# Patient Record
Sex: FEMALE | Race: WHITE | NOT HISPANIC OR LATINO
[De-identification: names, ages, dates, MRNs, and addresses within clinical notes are randomized per-mention and may not be internally consistent; named-entity substitution may affect disease eponyms.]

---

## 2017-10-16 ENCOUNTER — HOSPITAL (OUTPATIENT)
Dept: OTHER | Age: 52
End: 2017-10-16
Attending: OBSTETRICS & GYNECOLOGY

## 2017-11-02 ENCOUNTER — HOSPITAL (OUTPATIENT)
Dept: OTHER | Age: 52
End: 2017-11-02
Attending: OBSTETRICS & GYNECOLOGY

## 2020-07-09 ENCOUNTER — OFFICE VISIT (OUTPATIENT)
Dept: SURGERY | Facility: CLINIC | Age: 55
End: 2020-07-09
Payer: COMMERCIAL

## 2020-07-09 VITALS
HEIGHT: 63.4 IN | OXYGEN SATURATION: 98 % | DIASTOLIC BLOOD PRESSURE: 89 MMHG | WEIGHT: 216 LBS | BODY MASS INDEX: 37.8 KG/M2 | SYSTOLIC BLOOD PRESSURE: 150 MMHG | HEART RATE: 71 BPM

## 2020-07-09 DIAGNOSIS — I10 ESSENTIAL HYPERTENSION: Primary | ICD-10-CM

## 2020-07-09 DIAGNOSIS — R03.0 ELEVATED BLOOD PRESSURE READING: ICD-10-CM

## 2020-07-09 DIAGNOSIS — E06.3 HASHIMOTO'S THYROIDITIS: ICD-10-CM

## 2020-07-09 DIAGNOSIS — R63.2 INCREASED APPETITE: ICD-10-CM

## 2020-07-09 DIAGNOSIS — E66.9 OBESITY (BMI 30-39.9): ICD-10-CM

## 2020-07-09 DIAGNOSIS — Z51.81 ENCOUNTER FOR THERAPEUTIC DRUG MONITORING: ICD-10-CM

## 2020-07-09 DIAGNOSIS — R53.82 CHRONIC FATIGUE: ICD-10-CM

## 2020-07-09 PROCEDURE — 99204 OFFICE O/P NEW MOD 45 MIN: CPT | Performed by: INTERNAL MEDICINE

## 2020-07-09 RX ORDER — PHENTERMINE HYDROCHLORIDE 30 MG/1
30 CAPSULE ORAL EVERY MORNING
Qty: 30 CAPSULE | Refills: 1 | Status: SHIPPED | OUTPATIENT
Start: 2020-07-09 | End: 2020-08-18

## 2020-07-09 RX ORDER — HYDROCHLOROTHIAZIDE 12.5 MG/1
12.5 CAPSULE, GELATIN COATED ORAL DAILY
Qty: 30 CAPSULE | Refills: 1 | Status: SHIPPED | OUTPATIENT
Start: 2020-07-09 | End: 2020-08-18 | Stop reason: DRUGHIGH

## 2020-07-09 NOTE — PROGRESS NOTES
[facility] Consultation Note       Patient:  Faye Stern  :      1965  MRN:      [id number]    Referring Provider: Dr. Segovia ref.  provider found       Chief Complaint:  Patient presents with:  Consult  Weight Management      SUB file        Inability: Not on file      Transportation needs:        Medical: Not on file        Non-medical: Not on file    Tobacco Use      Smoking status: Never Smoker      Smokeless tobacco: Never Used    Substance and Sexual Activity      Alcohol use: fresh fruit or vegetables daily    Behavior Modifications Reviewed and Discussed:    Eat breakfast, Eat 3 meals per day, Plan meals in advance, Read nutrition labels, Drink 64oz of water per day, Maintain a daily food journal, No drinking 30 minutes before regular soda. 3. Increase activity-upper body exercises, walk 10 minutes per day. 4. Increase fruit and vegetable servings to 5-6 per day.         PHENTERMINE: Since the patient would like to try phentermine, and is aware of the potential side effects (hy THYROID STIM HORMONE; Future  -     CBC, PLATELET; NO DIFFERENTIAL; Future  -     Phentermine HCl 30 MG Oral Cap; Take 1 capsule (30 mg total) by mouth every morning.     Hashimoto's thyroiditis  -     LEPTIN, SERUM; Future  -     COMP METABOLIC PANEL (14);

## 2020-07-14 ENCOUNTER — APPOINTMENT (OUTPATIENT)
Dept: LAB | Facility: HOSPITAL | Age: 55
End: 2020-07-14
Attending: INTERNAL MEDICINE
Payer: COMMERCIAL

## 2020-07-14 DIAGNOSIS — Z51.81 ENCOUNTER FOR THERAPEUTIC DRUG MONITORING: ICD-10-CM

## 2020-07-14 DIAGNOSIS — R53.82 CHRONIC FATIGUE: ICD-10-CM

## 2020-07-14 DIAGNOSIS — E66.9 OBESITY (BMI 30-39.9): ICD-10-CM

## 2020-07-14 DIAGNOSIS — I10 ESSENTIAL HYPERTENSION: ICD-10-CM

## 2020-07-14 DIAGNOSIS — R63.2 INCREASED APPETITE: ICD-10-CM

## 2020-07-14 DIAGNOSIS — R03.0 ELEVATED BLOOD PRESSURE READING: ICD-10-CM

## 2020-07-14 DIAGNOSIS — E06.3 HASHIMOTO'S THYROIDITIS: ICD-10-CM

## 2020-07-14 LAB
ALBUMIN SERPL-MCNC: 3.7 G/DL (ref 3.4–5)
ALBUMIN/GLOB SERPL: 1.1 {RATIO} (ref 1–2)
ALP LIVER SERPL-CCNC: 62 U/L (ref 41–108)
ALT SERPL-CCNC: 25 U/L (ref 13–56)
ANION GAP SERPL CALC-SCNC: 6 MMOL/L (ref 0–18)
AST SERPL-CCNC: 14 U/L (ref 15–37)
BILIRUB SERPL-MCNC: 0.7 MG/DL (ref 0.1–2)
BUN BLD-MCNC: 18 MG/DL (ref 7–18)
BUN/CREAT SERPL: 27.3 (ref 10–20)
CALCIUM BLD-MCNC: 8.9 MG/DL (ref 8.5–10.1)
CHLORIDE SERPL-SCNC: 105 MMOL/L (ref 98–112)
CHOLEST SMN-MCNC: 179 MG/DL (ref ?–200)
CO2 SERPL-SCNC: 29 MMOL/L (ref 21–32)
CREAT BLD-MCNC: 0.66 MG/DL (ref 0.55–1.02)
DEPRECATED RDW RBC AUTO: 44.5 FL (ref 35.1–46.3)
ERYTHROCYTE [DISTWIDTH] IN BLOOD BY AUTOMATED COUNT: 13.2 % (ref 11–15)
GLOBULIN PLAS-MCNC: 3.5 G/DL (ref 2.8–4.4)
GLUCOSE BLD-MCNC: 91 MG/DL (ref 70–99)
HCT VFR BLD AUTO: 44.1 % (ref 35–48)
HDLC SERPL-MCNC: 69 MG/DL (ref 40–59)
HGB BLD-MCNC: 14.8 G/DL (ref 12–16)
LDLC SERPL CALC-MCNC: 96 MG/DL (ref ?–100)
M PROTEIN MFR SERPL ELPH: 7.2 G/DL (ref 6.4–8.2)
MCH RBC QN AUTO: 30.8 PG (ref 26–34)
MCHC RBC AUTO-ENTMCNC: 33.6 G/DL (ref 31–37)
MCV RBC AUTO: 91.9 FL (ref 80–100)
NONHDLC SERPL-MCNC: 110 MG/DL (ref ?–130)
OSMOLALITY SERPL CALC.SUM OF ELEC: 291 MOSM/KG (ref 275–295)
PATIENT FASTING Y/N/NP: YES
PATIENT FASTING Y/N/NP: YES
PLATELET # BLD AUTO: 253 10(3)UL (ref 150–450)
POTASSIUM SERPL-SCNC: 3.8 MMOL/L (ref 3.5–5.1)
RBC # BLD AUTO: 4.8 X10(6)UL (ref 3.8–5.3)
SODIUM SERPL-SCNC: 140 MMOL/L (ref 136–145)
TRIGL SERPL-MCNC: 71 MG/DL (ref 30–149)
TSI SER-ACNC: 1.84 MIU/ML (ref 0.36–3.74)
VIT B12 SERPL-MCNC: 386 PG/ML (ref 193–986)
VLDLC SERPL CALC-MCNC: 14 MG/DL (ref 0–30)
WBC # BLD AUTO: 9.4 X10(3) UL (ref 4–11)

## 2020-07-14 PROCEDURE — 80053 COMPREHEN METABOLIC PANEL: CPT

## 2020-07-14 PROCEDURE — 85027 COMPLETE CBC AUTOMATED: CPT

## 2020-07-14 PROCEDURE — 80061 LIPID PANEL: CPT

## 2020-07-14 PROCEDURE — 82397 CHEMILUMINESCENT ASSAY: CPT

## 2020-07-14 PROCEDURE — 36415 COLL VENOUS BLD VENIPUNCTURE: CPT

## 2020-07-14 PROCEDURE — 82607 VITAMIN B-12: CPT

## 2020-07-14 PROCEDURE — 84443 ASSAY THYROID STIM HORMONE: CPT

## 2020-07-16 LAB — LEPTIN: 36.2 NG/ML

## 2020-08-18 ENCOUNTER — OFFICE VISIT (OUTPATIENT)
Dept: SURGERY | Facility: CLINIC | Age: 55
End: 2020-08-18
Payer: COMMERCIAL

## 2020-08-18 VITALS
WEIGHT: 207 LBS | SYSTOLIC BLOOD PRESSURE: 169 MMHG | BODY MASS INDEX: 36.22 KG/M2 | OXYGEN SATURATION: 99 % | HEIGHT: 63.4 IN | HEART RATE: 75 BPM | DIASTOLIC BLOOD PRESSURE: 91 MMHG

## 2020-08-18 DIAGNOSIS — R63.2 INCREASED APPETITE: ICD-10-CM

## 2020-08-18 DIAGNOSIS — I10 HTN (HYPERTENSION), BENIGN: Primary | ICD-10-CM

## 2020-08-18 DIAGNOSIS — E66.9 OBESITY (BMI 30-39.9): ICD-10-CM

## 2020-08-18 PROCEDURE — 3080F DIAST BP >= 90 MM HG: CPT | Performed by: INTERNAL MEDICINE

## 2020-08-18 PROCEDURE — 3077F SYST BP >= 140 MM HG: CPT | Performed by: INTERNAL MEDICINE

## 2020-08-18 PROCEDURE — 99214 OFFICE O/P EST MOD 30 MIN: CPT | Performed by: INTERNAL MEDICINE

## 2020-08-18 PROCEDURE — 3008F BODY MASS INDEX DOCD: CPT | Performed by: INTERNAL MEDICINE

## 2020-08-18 RX ORDER — HYDROCHLOROTHIAZIDE 25 MG/1
25 TABLET ORAL DAILY
Qty: 30 TABLET | Refills: 1 | Status: SHIPPED | OUTPATIENT
Start: 2020-08-18 | End: 2020-12-15

## 2020-08-18 RX ORDER — PHENTERMINE HYDROCHLORIDE 30 MG/1
30 CAPSULE ORAL EVERY MORNING
Qty: 30 CAPSULE | Refills: 1 | Status: SHIPPED | OUTPATIENT
Start: 2020-08-18 | End: 2020-10-13

## 2020-08-18 RX ORDER — TOPIRAMATE 25 MG/1
25 TABLET ORAL EVERY EVENING
Qty: 30 TABLET | Refills: 2 | Status: SHIPPED | OUTPATIENT
Start: 2020-08-18 | End: 2021-02-04

## 2020-08-18 NOTE — PROGRESS NOTES
[address]  Dept: [phone]       Patient:  Gutierrez Colmenares  :      1965  MRN:      [id number]    Chief Complaint:  Patient presen Non-medical: Not on file    Tobacco Use      Smoking status: Never Smoker      Smokeless tobacco: Never Used    Substance and Sexual Activity      Alcohol use: Yes        Comment: Socially 3 X per week      Drug use: No      Sexual activity: Not on jan Goals  Limit carbohydrates to 100 gms per day, Eat 100-200 calories within 1 hour of waking  and Eat 3-4 cups of fresh fruits or vegetables daily    Behavior Modifications Reviewed and Discussed  Eat breakfast, Eat 3 meals per day, Plan meals in advance, R Patient is not interested in bariatric surgery. Patient desires to pursue traditional weight loss at this time. HTN: not controlled  Will increase hctz to 25 mg    Should check bp at home  Will message me in two weeks    Goals for next month:  1.

## 2020-10-13 DIAGNOSIS — E66.9 OBESITY (BMI 30-39.9): ICD-10-CM

## 2020-10-13 RX ORDER — PHENTERMINE HYDROCHLORIDE 30 MG/1
CAPSULE ORAL
Qty: 30 CAPSULE | Refills: 0 | Status: SHIPPED | OUTPATIENT
Start: 2020-10-13 | End: 2020-11-11

## 2020-11-10 DIAGNOSIS — E66.9 OBESITY (BMI 30-39.9): ICD-10-CM

## 2020-11-11 RX ORDER — PHENTERMINE HYDROCHLORIDE 30 MG/1
CAPSULE ORAL
Qty: 30 CAPSULE | Refills: 0 | Status: SHIPPED | OUTPATIENT
Start: 2020-11-11 | End: 2020-12-08

## 2020-12-08 DIAGNOSIS — E66.9 OBESITY (BMI 30-39.9): ICD-10-CM

## 2020-12-08 RX ORDER — PHENTERMINE HYDROCHLORIDE 30 MG/1
CAPSULE ORAL
Qty: 30 CAPSULE | Refills: 0 | Status: SHIPPED | OUTPATIENT
Start: 2020-12-08 | End: 2020-12-15

## 2020-12-15 ENCOUNTER — OFFICE VISIT (OUTPATIENT)
Dept: SURGERY | Facility: CLINIC | Age: 55
End: 2020-12-15
Payer: COMMERCIAL

## 2020-12-15 VITALS
DIASTOLIC BLOOD PRESSURE: 87 MMHG | SYSTOLIC BLOOD PRESSURE: 131 MMHG | OXYGEN SATURATION: 99 % | HEART RATE: 82 BPM | WEIGHT: 204 LBS | HEIGHT: 63.4 IN | BODY MASS INDEX: 35.7 KG/M2

## 2020-12-15 DIAGNOSIS — E66.9 OBESITY (BMI 30-39.9): ICD-10-CM

## 2020-12-15 DIAGNOSIS — Z51.81 ENCOUNTER FOR THERAPEUTIC DRUG MONITORING: ICD-10-CM

## 2020-12-15 DIAGNOSIS — R63.2 INCREASED APPETITE: ICD-10-CM

## 2020-12-15 DIAGNOSIS — I10 HTN (HYPERTENSION), BENIGN: Primary | ICD-10-CM

## 2020-12-15 PROCEDURE — 3008F BODY MASS INDEX DOCD: CPT | Performed by: INTERNAL MEDICINE

## 2020-12-15 PROCEDURE — 3075F SYST BP GE 130 - 139MM HG: CPT | Performed by: INTERNAL MEDICINE

## 2020-12-15 PROCEDURE — 3079F DIAST BP 80-89 MM HG: CPT | Performed by: INTERNAL MEDICINE

## 2020-12-15 PROCEDURE — 99214 OFFICE O/P EST MOD 30 MIN: CPT | Performed by: INTERNAL MEDICINE

## 2020-12-15 RX ORDER — HYDROCHLOROTHIAZIDE 25 MG/1
25 TABLET ORAL DAILY
Qty: 30 TABLET | Refills: 1 | Status: SHIPPED | OUTPATIENT
Start: 2020-12-15 | End: 2021-02-19

## 2020-12-15 RX ORDER — LISINOPRIL 40 MG/1
TABLET ORAL
COMMUNITY
Start: 2020-12-08 | End: 2021-04-15

## 2020-12-15 RX ORDER — PHENTERMINE HYDROCHLORIDE 30 MG/1
30 CAPSULE ORAL EVERY MORNING
Qty: 30 CAPSULE | Refills: 2 | Status: SHIPPED | OUTPATIENT
Start: 2020-12-15 | End: 2021-04-15 | Stop reason: ALTCHOICE

## 2020-12-15 NOTE — PROGRESS NOTES
[address]  Dept: [phone]       Patient:  Lorena Quiroz  :      1965  MRN:      [id number]    Chief Complaint:  Patient presen Transportation needs        Medical: Not on file        Non-medical: Not on file    Tobacco Use      Smoking status: Never Smoker      Smokeless tobacco: Never Used    Substance and Sexual Activity      Alcohol use: Yes        Comment: Socially 3 X per wee Exercise     Nutritional Goals  Limit carbohydrates to 100 gms per day, Eat 100-200 calories within 1 hour of waking  and Eat 3-4 cups of fresh fruits or vegetables daily    Behavior Modifications Reviewed and Discussed  Eat breakfast, Eat 3 meals per day, therapeutic drug monitoring  Obesity (bmi 30-39. 9)    PLAN     Patient is not interested in bariatric surgery. Patient desires to pursue traditional weight loss at this time. HYPERTENSION: Blood pressure stable on the above medications.  No interval ch

## 2021-01-19 ENCOUNTER — EKG ENCOUNTER (OUTPATIENT)
Dept: LAB | Facility: HOSPITAL | Age: 56
End: 2021-01-19
Attending: INTERNAL MEDICINE
Payer: COMMERCIAL

## 2021-01-19 DIAGNOSIS — R63.2 INCREASED APPETITE: ICD-10-CM

## 2021-01-19 DIAGNOSIS — E66.9 OBESITY (BMI 30-39.9): ICD-10-CM

## 2021-01-19 DIAGNOSIS — R03.0 ELEVATED BLOOD PRESSURE READING: ICD-10-CM

## 2021-01-19 PROCEDURE — 93010 ELECTROCARDIOGRAM REPORT: CPT | Performed by: INTERNAL MEDICINE

## 2021-01-19 PROCEDURE — 93005 ELECTROCARDIOGRAM TRACING: CPT

## 2021-02-04 DIAGNOSIS — E66.9 OBESITY (BMI 30-39.9): ICD-10-CM

## 2021-02-04 RX ORDER — TOPIRAMATE 25 MG/1
25 TABLET ORAL EVERY EVENING
Qty: 30 TABLET | Refills: 1 | Status: SHIPPED | OUTPATIENT
Start: 2021-02-04 | End: 2021-04-15 | Stop reason: ALTCHOICE

## 2021-02-19 DIAGNOSIS — I10 HTN (HYPERTENSION), BENIGN: ICD-10-CM

## 2021-02-19 RX ORDER — HYDROCHLOROTHIAZIDE 25 MG/1
TABLET ORAL
Qty: 30 TABLET | Refills: 1 | Status: SHIPPED | OUTPATIENT
Start: 2021-02-19 | End: 2021-04-15

## 2021-04-20 DIAGNOSIS — E66.9 OBESITY (BMI 30-39.9): ICD-10-CM

## 2021-04-20 RX ORDER — TOPIRAMATE 25 MG/1
TABLET ORAL
Qty: 30 TABLET | Refills: 1 | OUTPATIENT
Start: 2021-04-20

## 2023-12-15 ENCOUNTER — OFFICE VISIT (OUTPATIENT)
Dept: UROLOGY | Facility: HOSPITAL | Age: 58
End: 2023-12-15
Attending: OBSTETRICS & GYNECOLOGY
Payer: COMMERCIAL

## 2023-12-15 VITALS — HEIGHT: 64 IN | TEMPERATURE: 98 F | WEIGHT: 198 LBS | BODY MASS INDEX: 33.8 KG/M2

## 2023-12-15 DIAGNOSIS — N81.84 PELVIC MUSCLE WASTING: ICD-10-CM

## 2023-12-15 DIAGNOSIS — N39.41 URGE INCONTINENCE: ICD-10-CM

## 2023-12-15 DIAGNOSIS — N39.3 FEMALE STRESS INCONTINENCE: ICD-10-CM

## 2023-12-15 DIAGNOSIS — N81.2 UTEROVAGINAL PROLAPSE, INCOMPLETE: ICD-10-CM

## 2023-12-15 DIAGNOSIS — N95.2 POSTMENOPAUSAL ATROPHIC VAGINITIS: ICD-10-CM

## 2023-12-15 DIAGNOSIS — N39.41 URGENCY INCONTINENCE: Primary | ICD-10-CM

## 2023-12-15 DIAGNOSIS — R35.1 NOCTURIA: ICD-10-CM

## 2023-12-15 LAB
BILIRUB UR QL: NEGATIVE
CLARITY UR: CLEAR
COLOR UR: YELLOW
CONTROL RUN WITHIN 24 HOURS?: YES
GLUCOSE UR-MCNC: NORMAL MG/DL
KETONES UR-MCNC: NEGATIVE MG/DL
LEUKOCYTE ESTERASE UR QL STRIP.AUTO: NEGATIVE
LEUKOCYTE ESTERASE URINE: NEGATIVE
NITRITE UR QL STRIP.AUTO: NEGATIVE
NITRITE URINE: NEGATIVE
PH UR: 6 [PH] (ref 5–8)
PROT UR-MCNC: NEGATIVE MG/DL
RBC #/AREA URNS AUTO: >10 /HPF
SP GR UR STRIP: 1.02 (ref 1–1.03)
UROBILINOGEN UR STRIP-ACNC: NORMAL

## 2023-12-15 PROCEDURE — 51701 INSERT BLADDER CATHETER: CPT | Performed by: OBSTETRICS & GYNECOLOGY

## 2023-12-15 PROCEDURE — 81001 URINALYSIS AUTO W/SCOPE: CPT | Performed by: OBSTETRICS & GYNECOLOGY

## 2023-12-15 PROCEDURE — 81002 URINALYSIS NONAUTO W/O SCOPE: CPT | Performed by: OBSTETRICS & GYNECOLOGY

## 2023-12-15 PROCEDURE — 99202 OFFICE O/P NEW SF 15 MIN: CPT

## 2023-12-15 PROCEDURE — 87086 URINE CULTURE/COLONY COUNT: CPT | Performed by: OBSTETRICS & GYNECOLOGY

## 2023-12-18 ENCOUNTER — TELEPHONE (OUTPATIENT)
Dept: UROLOGY | Facility: HOSPITAL | Age: 58
End: 2023-12-18

## 2023-12-18 NOTE — TELEPHONE ENCOUNTER
Outgoing telephone call to patient  The urine culture obtained on 12/15/23 showed no growth; however, the urinalysis obtained on 12/15/23 showed RBCs >10. You have a hx of microscopic hematuria and previously had an evaluation of the urinary tract system, 5/2021, with Urology, which including CT urogram and an office cystoscopy - WN. Urine cytology was not preformed at the time, which is necessary to make the microscopic hematuria w/u complete. We will obtain urine cytology at your upcoming UDS apt - 1/8/23 @ 8am.  All questions answered. Encouraged patient to call office with additional questions or concerns. Patient understands and agrees to plan.

## 2024-01-08 ENCOUNTER — OFFICE VISIT (OUTPATIENT)
Dept: UROLOGY | Facility: HOSPITAL | Age: 59
End: 2024-01-08
Attending: OBSTETRICS & GYNECOLOGY
Payer: COMMERCIAL

## 2024-01-08 VITALS — WEIGHT: 198 LBS | BODY MASS INDEX: 33.8 KG/M2 | HEIGHT: 64 IN

## 2024-01-08 DIAGNOSIS — N81.2 UTEROVAGINAL PROLAPSE, INCOMPLETE: Primary | ICD-10-CM

## 2024-01-08 DIAGNOSIS — R31.29 MICROSCOPIC HEMATURIA: ICD-10-CM

## 2024-01-08 DIAGNOSIS — N39.41 URGE INCONTINENCE: ICD-10-CM

## 2024-01-08 LAB
CONTROL RUN WITHIN 24 HOURS?: YES
LEUKOCYTE ESTERASE URINE: NEGATIVE
NITRITE URINE: NEGATIVE

## 2024-01-08 PROCEDURE — 51797 INTRAABDOMINAL PRESSURE TEST: CPT

## 2024-01-08 PROCEDURE — 81002 URINALYSIS NONAUTO W/O SCOPE: CPT

## 2024-01-08 PROCEDURE — 51729 CYSTOMETROGRAM W/VP&UP: CPT

## 2024-01-08 PROCEDURE — 51784 ANAL/URINARY MUSCLE STUDY: CPT

## 2024-01-08 PROCEDURE — 51741 ELECTRO-UROFLOWMETRY FIRST: CPT

## 2024-01-08 NOTE — PROCEDURES
Patient here for urodynamic testing.  Procedure explained and confirmed by patient.  See evaluation form for results.  Both verbal and written discharge instructions were given.  Patient tolerated procedure well and will follow up with Dr. Jada Santiago on 2024 for a UDS follow up at 12:30 via telephone .    URODYNAMIC EVALUATION    PATIENT HISTORY:    Prolapse:  Yes (Reports splinting for comfort)  ERIC:  No  UUI:  Yes  Nocturia:  more than 4 (Reports 3 to 5 times per night)  Frequency:  every 1-2 hours  Sense of Incomplete Emptying:  Yes (Reports shifting positions to void)  Constipation:  No (Bowel regimen reviewed. Handouts provided)  Last void prior to UDS testin.5 hours  Current urge to void?  Moderate  OAB meds stopped prior to test?  NA  Other symptoms?      Surgery?  []  No  [x]  Interested in surgery:   [x]  Yes, specify date: Surgery scheduled on 2024 for a Vaginal Hysterectomy, Poss. Bilateral Salping-Oophorectomy, USLS, EPER, Poss. Mus. and Cysto.     Surgical folder provided?  [x]  Yes  []  No Informed consent signed and scanned into the patients chart.     PATIENT DIAGNOSIS:  Cystocele, Midline N81.11, Rectocele, Midline R15.9, Urge Incontinence N39.41, and Uterovaginal Prolapse N81.2 (incomplete)    UDS PROCEDURAL FINDINGS:  Cystocele, Midline N81.11, Rectocele, Midline R15.9, Urge Incontinence N39.41, Uterovaginal Prolapse N81.2 (incomplete), and Detrusor Instability N31.9    MEDICATION:   No outpatient medications have been marked as taking for the 24 encounter (Office Visit) with [facility] PROCEDURE.        ALLERGIES:  Patient has no known allergies.      EXAM:  Urinalysis Dip:     Urine Cytology obtained and sent to the lab ( Hx. of Microscopic Hematuria) (CT Urogram, Cysto by Urology  wnl).  Today's Results   Component Date Value    control run 2024 Yes     Blood Urine 2024 Trace (A)     Nitrite Urine 2024 Negative     Leukocyte esterase urine 2024  Negative       [place] ( >30 degrees ):  [x]  Mobile  []  Fixed    Perineal Sensation:  [x]  Normal  []  Abnormal    Additional Notes:    PROLAPSE:  [x]  Yes  []  No  Prolapse reduced for testing?  [x]  Yes  []  No  [x]  Pessary for flow study  []  Manual  [x]  Half Speculum throughout testing    Additional Notes:    UROFLOWMETRY:  Unreduced    Voided Volume:               156              mL  Maximum Flow Rate:                    11            mL/sec  Average flow rate:                   6           mL/sec  Post-void Residual:               30           mL  Pattern:  []  Normal  []  Poor flow     [x]  Intermittent []  Other  Void:   [x]  Typical  []  Atypical    Additional Notes:     CYSTOMETRY:  Urethral Catheter:  Fr 7 / tdoc  Abd Catheter:     Fr 7 / tdoc   Infusion:  Water Rate 30 mL/min  Temp:  Room  Position:  [x]  Sit  []  Stand  []  Supine  First sensation:   64 mL  First desire to void:   125 mL  Strong desire to void:  227 mL  Maximum cystometric capacity:   300 mL  Detrusor Activity:  [x]  Unstable   []  Stable  Urge leakage?    []  Yes [x]  No  Volume at 1st unhibited detrusor cont:   75 mL  Detrusor instability provoked by:    [x]  Spontaneous []  Coughing  [x]  Filling  []  Valsalva  []  Other    Additional Notes:      URETHRAL FUNCTION:  Valsava (vesical) Leak Point Pressures:    Volume Leak Point Pressure Leak?    Cough Valsalva      100mL 128 75    cm H2O []  Yes [x]  No         REDUCED:half speculum  Volume Leak Point Pressure Leak?    Cough Valsalva      100mL 157 87    cm H2O []  Yes [x]  No   200mL 151 77    cm H2O []  Yes [x]  No   300mL 159 96    cm H2O []  Yes [x]  No     Genuine Stress Incontinence demonstrated?   []  Yes  [x]  No    Resting Urethral Pressure Profile:     Functional Urethral Length:         0.9 cm          0.9       cm    Maximum UCP:          76 cm          93   cm       PRESSURE/FLOW STUDY:  Reduced    Voided volume:   633     mL  Maximum flow rate:     5    mL/sec  Pressure Detrusor (at maximum flow):         84   cm H2O  Post void residual:      6         mL  Voiding mechanism:  []  Abnormal  [x]  Normal  []  Strain to void   []  Weak detrusor  Void:   [x]  Typical   []  Atypical    Additional Notes: Patient reports difficulty with voiding while sitting on UDS chair. Patient assisted up to the bathroom for flow study. Patient voided 633 mLs. PVR 6 mLs. #1 ring with support removed after testing completed.   Uroflowmetry, cystometry, and pressure / flow study were completed using calibrated electronic equipment and air charged transducers.    EMG:  During fill: Reactive    During flow study: Reactive    1/8/2024 9:21 AM     PERFORMED BY:  Dee Dee CASE RN      URODYNAMIC PHYSICIAN INTERPRETATION    IMPRESSION:   156/30cc & 633/6cc  [facility] 300cc  +DO @ 75cc  No ERIC    Post-Procedure Diagnoses: Detrusor instability [N31.9]    Comments:      Jada Santiago DO   1/8/2024   1:00 PM

## 2024-01-08 NOTE — PATIENT INSTRUCTIONS
[facility] UROGYNECOLOGY  [address]  [facility] [place] [facility] [address]  PH: [phone]  FAX: [phone]       Urodynamic Testing Discharge Instructions:    There are NO dietary or activity restrictions.  You may resume your normal schedule.      You may have mild discomfort for a few hours after your testing today.  There may be some mild burning when you urinate or you may see some blood in your urine.  These problems should not last more than 24 hours.  The following suggestions may minimize any symptoms you experience.    Drink 6-8 large glasses of water over the next 8 hours  A compress or sitz bath may be soothing  Tylenol or Ibuprofen may be taken as needed    If you experience any of the following, please call the office or, if after hours, the on-call physician at [phone].    Excessive pain  Bright red bloody discharge  Fever or chills  Continued urgency, frequency or burning with urination    Obtaining Test Results    Your urodynamic test will be interpreted by a specialist and available to the referring physician within 7-14 days.  Patients in our clinic are given an appointment to come back to discuss the results and any appropriate treatment recommendations.    Please do not hesitate to contact our office with any questions or concerns at [phone].    I acknowledge that I have received verbal and written discharge  instructions and that I understand these instructions clearly.    Patient Signature:    Date:    [facility]    BOWEL REGIMEN    Constipation can have detrimental effects on bladder function and can worsen the symptoms of prolapse.  It is important to avoid constipation.    The first step for treating constipation is to increase the amount of fiber in your diet.  It is usually difficult to get enough fiber each day in the food we eat.  Therefore, the best way to increase fiber is to take a  daily fiber supplement.      _____Fiber Supplement  (Metamucil, Citrucel, Benefiber, etc)    Begin with 1 dose (as instructed on the package) every morning.    If the stool is too hard, or if the stool consists of small, hard, marbles, you may need to increase to 1 dose each morning and 1 dose each evening.    If you find it difficult to take the fiber as directed (i.e. mixed with water or juice), you can mix the fiber with your cereal or with applesauce.    Don’t worry if you have to increase the amount---fiber is not addictive and will not cause diarrhea.      _____Milk of Magnesia    Begin with 1 teaspoon every evening.  This is a very low dose---approximately one-sixth of the typical dose.  You may need to increase the amount depending on your results.      _____Miralax    Miralax is a laxative available over the counter.  It can be used on a long-term basis if necessary.  The usual starting dose is 1 capful of powder mixed in fluid each morning.  The dose can be adjusted up or down depending on results and consistency of stool.[facility]    HAVING A URINARY CATHETER AFTER SURGERY    What is a urinary catheter?  A catheter is a soft tube used to drain urine from your bladder.    Why do I need a catheter?  A urinary catheter is used to help with healing immediately after surgery.  It works to keep your bladder empty while you are recovering.  Surgery, swelling and anesthesia can cause the bladder to lose its normal sensations for a while.  The catheter may stay in place for a week or more after you go home.    Where will I go to get the catheter removed?  Your catheter will be removed in the office.  Please call the office to schedule a voiding trial with the nurse.    How will the catheter be removed?  A nurse will disconnect your catheter from the drainage bag.  She will attach a syringe to the catheter and fill the bladder with sterile water until you have a  strong desire to urinate.  The catheter will be removed and you will be able to go to the bathroom to empty your bladder.    Will the catheter need to be replaced?  You will need to urinate a specific amount, depending on how much you were initially able to hold.  Your catheter will only need to be replaced if you are not able to empty the specific amount.  If the catheter is replaced, your nurse will discuss with you when you need to return.    What do I do after the catheter is removed?  For the first 24 hours, you should go to the bathroom with your first urge or every 2-3 hours while you are awake.  Urinate before you go to bed and if you wake up in the night, you do not need to set an alarm to wake up.  Drink plenty of water (6-8 glasses) slowly throughout the day.  Avoid liquids containing caffeine.  Continue with any pain medications (Motrin) as directed by the nurse.  Continue with your current bowel regime; avoid constipation.    What if I have trouble emptying my bladder?  If you are having trouble emptying your bladder, try the following tips:  Urinate normally then stand up, walk around for a minute or two, sit back down on the commode and attempt to urinate (double void).  Sit in a tub of warm water and try to urinate in the tub.  Run warm water over your vaginal area while attempting to urinate.    When should I call the office?  If you are unable to urinate for 6 hours.  You feel you need to urinate but cannot.  Urinating frequently in small amounts.  You experience abdominal bloating, pressure or back pain.  You have a fever over 100.5 for 4 hours or above 101.0 anytime.  You have nausea and/or vomiting.  Burning/pain with urination.    Please feel free to call the nurse at [phone] with any questions 8am-4:30pm Monday-Friday.    If the office is closed, you may call the on call physician at [phone] or go to the emergency room.[facility]      Post-Operative Guidelines    AVOID CONSTIPATION - Take Miralax: one capful in water or juice each morning.  You can also take each evening if needed.  No lifting over 10 lbs. (1 gallon of milk is 8 lbs.)  It is okay to walk up and down stairs and to walk outside, but be careful not to become fatigued.  Showers and tub baths are okay, even if you have a catheter or abdominal incision.  You may ride in a car immediately.  You may drive after 1-2 weeks.    Please call us for any of the following:  Temperature above 100.5 for 4 hours or above 101.0 at any time  Chest pain or trouble breathing  Vaginal bleeding heavier than a period  Redness, tenderness or swelling of your legs  Pain or burning when you urinate  Redness, pain or foul discharge from incision

## 2024-01-09 ENCOUNTER — TELEPHONE (OUTPATIENT)
Dept: UROLOGY | Facility: HOSPITAL | Age: 59
End: 2024-01-09

## 2024-01-09 LAB — NON GYNE INTERPRETATION: NEGATIVE

## 2024-01-09 NOTE — TELEPHONE ENCOUNTER
Left message for patient that her urine cytology was reviewed by PEGGY AUGUSTE and was benign.  Pt left call back number if she has any questions or concern.

## 2024-01-17 ENCOUNTER — VIRTUAL PHONE E/M (OUTPATIENT)
Dept: UROLOGY | Facility: HOSPITAL | Age: 59
End: 2024-01-17
Attending: OBSTETRICS & GYNECOLOGY
Payer: COMMERCIAL

## 2024-01-17 DIAGNOSIS — N32.81 DETRUSOR INSTABILITY: ICD-10-CM

## 2024-01-17 DIAGNOSIS — N39.41 URGE INCONTINENCE: ICD-10-CM

## 2024-01-17 DIAGNOSIS — N39.41 URGENCY INCONTINENCE: ICD-10-CM

## 2024-01-17 DIAGNOSIS — N81.2 UTEROVAGINAL PROLAPSE, INCOMPLETE: Primary | ICD-10-CM

## 2024-01-17 NOTE — PROGRESS NOTES
Pt presents w/ initial c/o bulge  Urodynamic testing undergone without complication.  Results reviewed with patient via telephone  Given circumstances surrounding COVID-19 this visit is being conducted as a televisit with pt's consent.  Pt in safe, private environment for televisit, provider located in the office setting    156/30cc & 633/6cc  [facility] 300cc  +DO @ 75cc  No ERIC    Discussed with patient mgmt options for DO/UUI, POP  Biggest bother is bulge, LBP, UUI & urinary urgency    Discussed dietary and behavioral modifications for mgmt of urinary symptoms  Discussed weight management and benefits of weight loss on urinary symptoms  Reviewed AUA/SUFU guidelines on mgmt of non-neurogenic OAB  Discussed pharmacologic and nonpharmacologic mgmt options of urinary symptoms - reviewed risks, benefits, alternatives, and goals of treatment  Discussed specific risks related to OAB meds including, but not limited to dry mouth, constipation, blurry vision, cognitive changes, and BP elevation.    Thorough discussion of surgical risks, benefits, and alternatives including, but not limited to bleeding/clots, infection, injury to nearby organs (urethra, bladder, ureters, bowel, blood vessels),dyspareunia, de lucita UI, worsening UI, recurrence, voiding dysfunction, and pain.  Discussed pain mgmt and potential need for narcotics. Discussed addiction potential with narcotics. IL  reviewed.    All questions answered.  Pt will proceed TVH poss BSO USVVS APE repair, cysto    Follow up two weeks post op    Dr. Jada Santiago    Pre op packet provided

## 2024-02-01 ENCOUNTER — TELEPHONE (OUTPATIENT)
Dept: UROLOGY | Facility: HOSPITAL | Age: 59
End: 2024-02-01

## 2024-02-01 NOTE — TELEPHONE ENCOUNTER
TC from pt asking if she needs to get any bloodwork prior to surgery. Per medical clearance form in [employer], pt is to have a medical evaluation (done), CBC, BMP, and EKG. Instructed pt to call PCP to arrange for these labs to be done. Pt understands and agrees to plan.

## 2024-02-10 ENCOUNTER — LAB ENCOUNTER (OUTPATIENT)
Dept: LAB | Facility: HOSPITAL | Age: 59
End: 2024-02-10
Attending: OBSTETRICS & GYNECOLOGY
Payer: COMMERCIAL

## 2024-02-10 ENCOUNTER — TELEPHONE (OUTPATIENT)
Dept: UROLOGY | Facility: CLINIC | Age: 59
End: 2024-02-10

## 2024-02-10 DIAGNOSIS — Z01.818 PREOP TESTING: ICD-10-CM

## 2024-02-10 LAB
ANTIBODY SCREEN: NEGATIVE
RH BLOOD TYPE: POSITIVE
RH BLOOD TYPE: POSITIVE
SARS-COV-2 RNA RESP QL NAA+PROBE: DETECTED

## 2024-02-10 PROCEDURE — 87635 SARS-COV-2 COVID-19 AMP PRB: CPT

## 2024-02-10 PROCEDURE — 86900 BLOOD TYPING SEROLOGIC ABO: CPT

## 2024-02-10 PROCEDURE — 36415 COLL VENOUS BLD VENIPUNCTURE: CPT

## 2024-02-10 PROCEDURE — 86850 RBC ANTIBODY SCREEN: CPT

## 2024-02-10 PROCEDURE — 86901 BLOOD TYPING SEROLOGIC RH(D): CPT

## 2024-02-10 NOTE — TELEPHONE ENCOUNTER
TC to pt in response to lab results  She felt bad last week, recent covid test +  Discussed with anesthesia, need to review current recommendations with PAT, LM with PAT  Will need to postpone her surgery  Will reschedule with Lucia  Pt understands and agrees to plan

## 2024-02-19 NOTE — PAT NURSING NOTE
Spoke to patient, states she tested positive to Covid on 2/10/24. States she only had a scratchy throat, a bit of runny nose. States she no longer have any active symptoms.

## 2024-02-21 NOTE — DISCHARGE INSTRUCTIONS
[facility]     Post-Operative Guidelines      AVOID CONSTIPATION - Take Miralax: one capful in water or juice each morning.  You can also take each evening if needed. Use milk of magnesia daily as needed to avoid straining with BMs  No lifting over 10 lbs. (1 gallon of milk is 8 lbs.)  It is okay to walk up and down stairs and to walk outside, but be careful not to become fatigued.  Showers and tub baths are okay, even if you have a catheter or abdominal incision.  You may ride in a car immediately.  You may drive after 1-2 weeks.    Please call us for any of the following:  Temperature above 100.5 for 4 hours or above 101.0 at any time  Chest pain or trouble breathing  Vaginal bleeding heavier than a period  Redness, tenderness or swelling of your legs  Pain or burning when you urinate  Redness, pain or foul discharge from incision    Office telephone, [phone], after hours [phone]       HOME INSTRUCTIONS  [facility] CARE INSTRUCTIONS: POST-OP ANESTHESIA  The medication that you received for sedation or general anesthesia can last up to 24 hours. Your judgment and reflexes may be altered, even if you feel like your normal self.      We Recommend:   Do not drive any motor vehicle or bicycle   Avoid mowing the lawn, playing sports, or working with power tools/applicances (power saws, electric knives or mixers)   That you have someone stay with you on your first night home   Do not drink alcohol or take sleeping pills or tranquilizers   Do not sign legal documents within 24 hours of your procedure   If you had a nerve block for your surgery, take extra care not to put any pressure on your arm or hand for 24 hours    It is normal:  For you to have a sore throat if you had a breathing tube during surgery (while you were asleep!). The sore throat should get better within 48 hours. You can gargle with warm salt water (1/2 tsp in 4 oz warm water) or use a  throat lozenge for comfort  To feel muscle aches or soreness especially in the abdomen, chest or neck. The achy feeling should go away in the next 24 hours  To feel weak, sleepy or \"wiped out\". Your should start feeling better in the next 24 hours.   To experience mild discomforts such as sore lip or tongue, headache, cramps, gas pains or a bloated feeling in your abdomen.   To experience mild back pain or soreness for a day or two if you had spinal or epidural anesthesia.   If you had laparoscopic surgery, to feel shoulder pain or discomfort on the day of surgery.   For some patients to have nausea after surgery/anesthesia    If you feel nausea or experience vomiting:   Try to move around less.   Eat less than usual or drink only liquids until the next morning   Nausea should resolve in about 24 hours    If you have a problem when you are at home:    Call your surgeons office   Discharge Instructions: After Your Surgery  You’ve just had surgery. During surgery, you were given medicine called anesthesia to keep you relaxed and free of pain. After surgery, you may have some pain or nausea. This is common. Here are some tips for feeling better and getting well after surgery.   Going home  Your healthcare provider will show you how to take care of yourself when you go home. They'll also answer your questions. Have an adult family member or friend drive you home. For the first 24 hours after your surgery:   Don't drive or use heavy equipment.  Don't make important decisions or sign legal papers.  Take medicines as directed.  Don't drink alcohol.  Have someone stay with you, if needed. They can watch for problems and help keep you safe.  Be sure to go to all follow-up visits with your healthcare provider. And rest after your surgery for as long as your provider tells you to.   Coping with pain  If you have pain after surgery, pain medicine will help you feel better. Take it as directed, before pain becomes severe. Also,  ask your healthcare provider or pharmacist about other ways to control pain. This might be with heat, ice, or relaxation. And follow any other instructions your surgeon or nurse gives you.      Stay on schedule with your medicine.     Tips for taking pain medicine  To get the best relief possible, remember these points:   Pain medicines can upset your stomach. Taking them with a little food may help.  Most pain relievers taken by mouth need at least 20 to 30 minutes to start to work.  Don't wait till your pain becomes severe before you take your medicine. Try to time your medicine so that you can take it before starting an activity. This might be before you get dressed, go for a walk, or sit down for dinner.  Constipation is a common side effect of some pain medicines. Call your healthcare provider before taking any medicines such as laxatives or stool softeners to help ease constipation. Also ask if you should skip any foods. Drinking lots of fluids and eating foods such as fruits and vegetables that are high in fiber can also help. Remember, don't take laxatives unless your surgeon has prescribed them.  Drinking alcohol and taking pain medicine can cause dizziness and slow your breathing. It can even be deadly. Don't drink alcohol while taking pain medicine.  Pain medicine can make you react more slowly to things. Don't drive or run machinery while taking pain medicine.  Your healthcare provider may tell you to take acetaminophen to help ease your pain. Ask them how much you're supposed to take each day. Acetaminophen or other pain relievers may interact with your prescription medicines or other over-the-counter (OTC) medicines. Some prescription medicines have acetaminophen and other ingredients in them. Using both prescription and OTC acetaminophen for pain can cause you to accidentally overdose. Read the labels on your OTC medicines with care. This will help you to clearly know the list of ingredients, how much  to take, and any warnings. It may also help you not take too much acetaminophen. If you have questions or don't understand the information, ask your pharmacist or healthcare provider to explain it to you before you take the OTC medicine.   Managing nausea  Some people have an upset stomach (nausea) after surgery. This is often because of anesthesia, pain, or pain medicine, less movement of food in the stomach, or the stress of surgery. These tips will help you handle nausea and eat healthy foods as you get better. If you were on a special food plan before surgery, ask your healthcare provider if you should follow it while you get better. Check with your provider on how your eating should progress. It may depend on the surgery you had. These general tips may help:   Don't push yourself to eat. Your body will tell you when to eat and how much.  Start off with clear liquids and soup. They're easier to digest.  Next try semi-solid foods as you feel ready. These include mashed potatoes, applesauce, and gelatin.  Slowly move to solid foods. Don’t eat fatty, rich, or spicy foods at first.  Don't force yourself to have 3 large meals a day. Instead eat smaller amounts more often.  Take pain medicines with a small amount of solid food, such as crackers or toast. This helps prevent nausea.  When to call your healthcare provider  Call your healthcare provider right away if you have any of these:   You still have too much pain, or the pain gets worse, after taking the medicine. The medicine may not be strong enough. Or there may be a complication from the surgery.  You feel too sleepy, dizzy, or groggy. The medicine may be too strong.  Side effects such as nausea or vomiting. Your healthcare provider may advise taking other medicines to .  Skin changes such as rash, itching, or hives. This may mean you have an allergic reaction. Your provider may advise taking other medicines.  The incision looks different (for instance, part of  it opens up).  Bleeding or fluid leaking from the incision site, and weren't told to expect that.  Fever of 100.4°F (38°C) or higher, or as directed by your provider.  Call 911  Call 911 right away if you have:   Trouble breathing  Facial swelling    If you have obstructive sleep apnea   You were given anesthesia medicine during surgery to keep you comfortable and free of pain. After surgery, you may have more apnea spells because of this medicine and other medicines you were given. The spells may last longer than normal.    At home:  Keep using the continuous positive airway pressure (CPAP) device when you sleep. Unless your healthcare provider tells you not to, use it when you sleep, day or night. CPAP is a common device used to treat obstructive sleep apnea.  Talk with your provider before taking any pain medicine, muscle relaxants, or sedatives. Your provider will tell you about the possible dangers of taking these medicines.  Contact your provider if your sleeping changes a lot even when taking medicines as directed.  [employer] last reviewed this educational content on 10/1/2021  © 0120-7831 [employer]. All rights reserved. This information is not intended as a substitute for professional medical care. Always follow your healthcare professional's instructions.       follow up in 1 week for voiding trial (nurse visit), 2 weeks with Dr. Santiago, sooner as needed

## 2024-02-26 ENCOUNTER — ANESTHESIA (OUTPATIENT)
Dept: SURGERY | Facility: HOSPITAL | Age: 59
End: 2024-02-26
Payer: COMMERCIAL

## 2024-02-26 ENCOUNTER — ANESTHESIA EVENT (OUTPATIENT)
Dept: SURGERY | Facility: HOSPITAL | Age: 59
End: 2024-02-26
Payer: COMMERCIAL

## 2024-02-26 ENCOUNTER — HOSPITAL ENCOUNTER (OUTPATIENT)
Facility: HOSPITAL | Age: 59
Discharge: HOME | End: 2024-02-27
Attending: OBSTETRICS & GYNECOLOGY | Admitting: OBSTETRICS & GYNECOLOGY
Payer: COMMERCIAL

## 2024-02-26 DIAGNOSIS — G89.18 POST-OP PAIN: Primary | ICD-10-CM

## 2024-02-26 PROBLEM — N81.2 UTEROVAGINAL PROLAPSE, INCOMPLETE: Status: ACTIVE | Noted: 2024-02-26

## 2024-02-26 PROCEDURE — 88307 TISSUE EXAM BY PATHOLOGIST: CPT | Performed by: OBSTETRICS & GYNECOLOGY

## 2024-02-26 PROCEDURE — 0UT97ZZ RESECTION OF UTERUS, VIA NATURAL OR ARTIFICIAL OPENING: ICD-10-PCS | Performed by: OBSTETRICS & GYNECOLOGY

## 2024-02-26 PROCEDURE — 0JQC0ZZ REPAIR PELVIC REGION SUBCUTANEOUS TISSUE AND FASCIA, OPEN APPROACH: ICD-10-PCS | Performed by: OBSTETRICS & GYNECOLOGY

## 2024-02-26 PROCEDURE — 0UT77ZZ RESECTION OF BILATERAL FALLOPIAN TUBES, VIA NATURAL OR ARTIFICIAL OPENING: ICD-10-PCS | Performed by: OBSTETRICS & GYNECOLOGY

## 2024-02-26 PROCEDURE — 0USG0ZZ REPOSITION VAGINA, OPEN APPROACH: ICD-10-PCS | Performed by: OBSTETRICS & GYNECOLOGY

## 2024-02-26 RX ORDER — FAMOTIDINE 20 MG/1
20 TABLET, FILM COATED ORAL ONCE
Status: COMPLETED | OUTPATIENT
Start: 2024-02-26 | End: 2024-02-26

## 2024-02-26 RX ORDER — ONDANSETRON 2 MG/ML
4 INJECTION INTRAMUSCULAR; INTRAVENOUS ONCE
Status: DISCONTINUED | OUTPATIENT
Start: 2024-02-26 | End: 2024-02-27

## 2024-02-26 RX ORDER — ROCURONIUM BROMIDE 10 MG/ML
INJECTION, SOLUTION INTRAVENOUS AS NEEDED
Status: DISCONTINUED | OUTPATIENT
Start: 2024-02-26 | End: 2024-02-26 | Stop reason: SURG

## 2024-02-26 RX ORDER — ONDANSETRON 4 MG/1
4 TABLET, FILM COATED ORAL EVERY 8 HOURS PRN
Status: DISCONTINUED | OUTPATIENT
Start: 2024-02-26 | End: 2024-02-27

## 2024-02-26 RX ORDER — HYDROCODONE BITARTRATE AND ACETAMINOPHEN 5; 325 MG/1; MG/1
1 TABLET ORAL EVERY 4 HOURS PRN
Status: DISCONTINUED | OUTPATIENT
Start: 2024-02-26 | End: 2024-02-27

## 2024-02-26 RX ORDER — IBUPROFEN 600 MG/1
600 TABLET ORAL EVERY 6 HOURS PRN
Qty: 30 TABLET | Refills: 3 | Status: SHIPPED | OUTPATIENT
Start: 2024-02-26

## 2024-02-26 RX ORDER — ONDANSETRON 2 MG/ML
4 INJECTION INTRAMUSCULAR; INTRAVENOUS EVERY 6 HOURS PRN
Status: DISCONTINUED | OUTPATIENT
Start: 2024-02-26 | End: 2024-02-26 | Stop reason: HOSPADM

## 2024-02-26 RX ORDER — HYDRALAZINE HYDROCHLORIDE 20 MG/ML
5 INJECTION INTRAMUSCULAR; INTRAVENOUS
Status: DISCONTINUED | OUTPATIENT
Start: 2024-02-26 | End: 2024-02-26 | Stop reason: HOSPADM

## 2024-02-26 RX ORDER — BUPIVACAINE HYDROCHLORIDE AND EPINEPHRINE 2.5; 5 MG/ML; UG/ML
INJECTION, SOLUTION INFILTRATION; PERINEURAL AS NEEDED
Status: DISCONTINUED | OUTPATIENT
Start: 2024-02-26 | End: 2024-02-26 | Stop reason: HOSPADM

## 2024-02-26 RX ORDER — HYDROCODONE BITARTRATE AND ACETAMINOPHEN 5; 325 MG/1; MG/1
1 TABLET ORAL EVERY 4 HOURS PRN
Qty: 20 TABLET | Refills: 0 | Status: SHIPPED | OUTPATIENT
Start: 2024-02-26

## 2024-02-26 RX ORDER — ACETAMINOPHEN 500 MG
1000 TABLET ORAL ONCE
Status: COMPLETED | OUTPATIENT
Start: 2024-02-26 | End: 2024-02-26

## 2024-02-26 RX ORDER — METOCLOPRAMIDE 10 MG/1
10 TABLET ORAL ONCE
Status: COMPLETED | OUTPATIENT
Start: 2024-02-26 | End: 2024-02-26

## 2024-02-26 RX ORDER — MIDAZOLAM HYDROCHLORIDE 1 MG/ML
INJECTION INTRAMUSCULAR; INTRAVENOUS AS NEEDED
Status: DISCONTINUED | OUTPATIENT
Start: 2024-02-26 | End: 2024-02-26 | Stop reason: SURG

## 2024-02-26 RX ORDER — PROCHLORPERAZINE EDISYLATE 5 MG/ML
5 INJECTION INTRAMUSCULAR; INTRAVENOUS EVERY 8 HOURS PRN
Status: DISCONTINUED | OUTPATIENT
Start: 2024-02-26 | End: 2024-02-26 | Stop reason: HOSPADM

## 2024-02-26 RX ORDER — ONDANSETRON 2 MG/ML
4 INJECTION INTRAMUSCULAR; INTRAVENOUS EVERY 8 HOURS PRN
Status: DISCONTINUED | OUTPATIENT
Start: 2024-02-26 | End: 2024-02-27

## 2024-02-26 RX ORDER — HYDROMORPHONE HYDROCHLORIDE 1 MG/ML
0.4 INJECTION, SOLUTION INTRAMUSCULAR; INTRAVENOUS; SUBCUTANEOUS EVERY 5 MIN PRN
Status: DISCONTINUED | OUTPATIENT
Start: 2024-02-26 | End: 2024-02-26 | Stop reason: HOSPADM

## 2024-02-26 RX ORDER — METOCLOPRAMIDE HYDROCHLORIDE 5 MG/ML
10 INJECTION INTRAMUSCULAR; INTRAVENOUS ONCE
Status: COMPLETED | OUTPATIENT
Start: 2024-02-26 | End: 2024-02-26

## 2024-02-26 RX ORDER — KETOROLAC TROMETHAMINE 30 MG/ML
30 INJECTION, SOLUTION INTRAMUSCULAR; INTRAVENOUS EVERY 6 HOURS
Status: DISCONTINUED | OUTPATIENT
Start: 2024-02-26 | End: 2024-02-27

## 2024-02-26 RX ORDER — HYDROMORPHONE HYDROCHLORIDE 1 MG/ML
0.4 INJECTION, SOLUTION INTRAMUSCULAR; INTRAVENOUS; SUBCUTANEOUS EVERY 2 HOUR PRN
Status: DISCONTINUED | OUTPATIENT
Start: 2024-02-26 | End: 2024-02-27

## 2024-02-26 RX ORDER — SODIUM CHLORIDE 9 MG/ML
INJECTION, SOLUTION INTRAVENOUS CONTINUOUS PRN
Status: DISCONTINUED | OUTPATIENT
Start: 2024-02-26 | End: 2024-02-26 | Stop reason: SURG

## 2024-02-26 RX ORDER — LABETALOL HYDROCHLORIDE 5 MG/ML
5 INJECTION, SOLUTION INTRAVENOUS EVERY 5 MIN PRN
Status: DISCONTINUED | OUTPATIENT
Start: 2024-02-26 | End: 2024-02-26 | Stop reason: HOSPADM

## 2024-02-26 RX ORDER — HYDROMORPHONE HYDROCHLORIDE 1 MG/ML
0.2 INJECTION, SOLUTION INTRAMUSCULAR; INTRAVENOUS; SUBCUTANEOUS EVERY 5 MIN PRN
Status: DISCONTINUED | OUTPATIENT
Start: 2024-02-26 | End: 2024-02-26 | Stop reason: HOSPADM

## 2024-02-26 RX ORDER — CEFAZOLIN SODIUM/WATER 2 G/20 ML
2 SYRINGE (ML) INTRAVENOUS ONCE
Status: COMPLETED | OUTPATIENT
Start: 2024-02-26 | End: 2024-02-26

## 2024-02-26 RX ORDER — HYDROMORPHONE HYDROCHLORIDE 1 MG/ML
0.6 INJECTION, SOLUTION INTRAMUSCULAR; INTRAVENOUS; SUBCUTANEOUS EVERY 5 MIN PRN
Status: DISCONTINUED | OUTPATIENT
Start: 2024-02-26 | End: 2024-02-26 | Stop reason: HOSPADM

## 2024-02-26 RX ORDER — HYDROCODONE BITARTRATE AND ACETAMINOPHEN 5; 325 MG/1; MG/1
1 TABLET ORAL ONCE
Status: COMPLETED | OUTPATIENT
Start: 2024-02-26 | End: 2024-02-26

## 2024-02-26 RX ORDER — SODIUM CHLORIDE, SODIUM LACTATE, POTASSIUM CHLORIDE, CALCIUM CHLORIDE 600; 310; 30; 20 MG/100ML; MG/100ML; MG/100ML; MG/100ML
INJECTION, SOLUTION INTRAVENOUS CONTINUOUS
Status: DISCONTINUED | OUTPATIENT
Start: 2024-02-26 | End: 2024-02-27

## 2024-02-26 RX ORDER — MORPHINE SULFATE 10 MG/ML
6 INJECTION, SOLUTION INTRAMUSCULAR; INTRAVENOUS EVERY 10 MIN PRN
Status: DISCONTINUED | OUTPATIENT
Start: 2024-02-26 | End: 2024-02-26 | Stop reason: HOSPADM

## 2024-02-26 RX ORDER — MORPHINE SULFATE 4 MG/ML
2 INJECTION, SOLUTION INTRAMUSCULAR; INTRAVENOUS EVERY 10 MIN PRN
Status: DISCONTINUED | OUTPATIENT
Start: 2024-02-26 | End: 2024-02-26 | Stop reason: HOSPADM

## 2024-02-26 RX ORDER — HYDROMORPHONE HYDROCHLORIDE 1 MG/ML
0.2 INJECTION, SOLUTION INTRAMUSCULAR; INTRAVENOUS; SUBCUTANEOUS EVERY 2 HOUR PRN
Status: DISCONTINUED | OUTPATIENT
Start: 2024-02-26 | End: 2024-02-27

## 2024-02-26 RX ORDER — NALOXONE HYDROCHLORIDE 0.4 MG/ML
0.08 INJECTION, SOLUTION INTRAMUSCULAR; INTRAVENOUS; SUBCUTANEOUS AS NEEDED
Status: DISCONTINUED | OUTPATIENT
Start: 2024-02-26 | End: 2024-02-26 | Stop reason: HOSPADM

## 2024-02-26 RX ORDER — LIDOCAINE HYDROCHLORIDE 10 MG/ML
INJECTION, SOLUTION EPIDURAL; INFILTRATION; INTRACAUDAL; PERINEURAL AS NEEDED
Status: DISCONTINUED | OUTPATIENT
Start: 2024-02-26 | End: 2024-02-26 | Stop reason: SURG

## 2024-02-26 RX ORDER — IBUPROFEN 600 MG/1
600 TABLET ORAL EVERY 6 HOURS SCHEDULED
Status: DISCONTINUED | OUTPATIENT
Start: 2024-02-27 | End: 2024-02-27

## 2024-02-26 RX ORDER — HYDROMORPHONE HYDROCHLORIDE 1 MG/ML
0.8 INJECTION, SOLUTION INTRAMUSCULAR; INTRAVENOUS; SUBCUTANEOUS EVERY 2 HOUR PRN
Status: DISCONTINUED | OUTPATIENT
Start: 2024-02-26 | End: 2024-02-27

## 2024-02-26 RX ORDER — FAMOTIDINE 10 MG/ML
20 INJECTION, SOLUTION INTRAVENOUS ONCE
Status: COMPLETED | OUTPATIENT
Start: 2024-02-26 | End: 2024-02-26

## 2024-02-26 RX ORDER — MORPHINE SULFATE 4 MG/ML
4 INJECTION, SOLUTION INTRAMUSCULAR; INTRAVENOUS EVERY 10 MIN PRN
Status: DISCONTINUED | OUTPATIENT
Start: 2024-02-26 | End: 2024-02-26 | Stop reason: HOSPADM

## 2024-02-26 RX ORDER — DEXAMETHASONE SODIUM PHOSPHATE 4 MG/ML
VIAL (ML) INJECTION AS NEEDED
Status: DISCONTINUED | OUTPATIENT
Start: 2024-02-26 | End: 2024-02-26 | Stop reason: SURG

## 2024-02-26 RX ORDER — IBUPROFEN 600 MG/1
600 TABLET ORAL EVERY 6 HOURS PRN
Status: DISCONTINUED | OUTPATIENT
Start: 2024-02-26 | End: 2024-02-27

## 2024-02-26 RX ORDER — KETOROLAC TROMETHAMINE 30 MG/ML
30 INJECTION, SOLUTION INTRAMUSCULAR; INTRAVENOUS ONCE
Status: COMPLETED | OUTPATIENT
Start: 2024-02-26 | End: 2024-02-26

## 2024-02-26 RX ADMIN — LIDOCAINE HYDROCHLORIDE 30 MG: 10 INJECTION, SOLUTION EPIDURAL; INFILTRATION; INTRACAUDAL; PERINEURAL at 11:21:00

## 2024-02-26 RX ADMIN — SODIUM CHLORIDE: 9 INJECTION, SOLUTION INTRAVENOUS at 11:30:00

## 2024-02-26 RX ADMIN — SODIUM CHLORIDE, SODIUM LACTATE, POTASSIUM CHLORIDE, CALCIUM CHLORIDE: 600; 310; 30; 20 INJECTION, SOLUTION INTRAVENOUS at 11:18:00

## 2024-02-26 RX ADMIN — MIDAZOLAM HYDROCHLORIDE 2 MG: 1 INJECTION INTRAMUSCULAR; INTRAVENOUS at 11:18:00

## 2024-02-26 RX ADMIN — SODIUM CHLORIDE, SODIUM LACTATE, POTASSIUM CHLORIDE, CALCIUM CHLORIDE: 600; 310; 30; 20 INJECTION, SOLUTION INTRAVENOUS at 12:46:00

## 2024-02-26 RX ADMIN — CEFAZOLIN SODIUM/WATER 2 G: 2 G/20 ML SYRINGE (ML) INTRAVENOUS at 11:30:00

## 2024-02-26 RX ADMIN — DEXAMETHASONE SODIUM PHOSPHATE 8 MG: 4 MG/ML VIAL (ML) INJECTION at 11:30:00

## 2024-02-26 RX ADMIN — ROCURONIUM BROMIDE 70 MG: 10 INJECTION, SOLUTION INTRAVENOUS at 11:21:00

## 2024-02-26 RX ADMIN — SODIUM CHLORIDE, SODIUM LACTATE, POTASSIUM CHLORIDE, CALCIUM CHLORIDE: 600; 310; 30; 20 INJECTION, SOLUTION INTRAVENOUS at 12:59:00

## 2024-02-26 NOTE — ANESTHESIA PROCEDURE NOTES
Airway  Date/Time: 2/26/2024 11:24 AM  Urgency: elective    Airway not difficult    General Information and Staff    Patient location during procedure: OR  Anesthesiologist: Ferdinand Joaquin MD  Performed: anesthesiologist   Performed by: Ferdinand Joaquin MD  Authorized by: Ferdinand Joaquin MD      Indications and Patient Condition  Indications for airway management: anesthesia  Sedation level: deep  Preoxygenated: yes  Patient position: sniffing  MILS maintained throughout  Mask difficulty assessment: 1 - vent by mask    Final Airway Details  Final airway type: endotracheal airway      Successful airway: ETT  Cuffed: yes   Successful intubation technique: Video laryngoscopy  Facilitating devices/methods: intubating stylet  Endotracheal tube insertion site: oral  Blade type: Dolphin Geeks video laryngoscope.  Blade size: #3  ETT size (mm): 7.5    Cormack-Lehane Classification: grade I - full view of glottis  Placement verified by: capnometry   Measured from: lips  ETT to lips (cm): 21  Number of attempts at approach: 1

## 2024-02-26 NOTE — ANESTHESIA POSTPROCEDURE EVALUATION
Patient: Mila Vasquez    Procedure Summary       Date: 02/26/24 Room / Location: [facility] MAIN OR 14 / [facility] MAIN OR    Anesthesia Start: 1118 Anesthesia Stop: 1300    Procedure: Transvaginal hysterectomy, bilateral salpingectomy , uterosacral vaginal vault suspension, anterior/ posterior /enterocele repair, cystoscopy (Vagina ) Diagnosis: (Uterovaginal prolapse, stress incontinence)    Surgeons: Jada Santiago DO Anesthesiologist: Ferdinand Joaquin MD    Anesthesia Type: general ASA Status: 2            Anesthesia Type: general    Vitals Value Taken Time   /83 02/26/24 1329   Temp 97.4 °F (36.3 °C) 02/26/24 1259   Pulse 71 02/26/24 1336   Resp 9 02/26/24 1336   SpO2 89 % 02/26/24 1336   Vitals shown include unfiled device data.    EM AN Post Evaluation:   Patient Evaluated in PACU  Patient Participation: complete - patient participated  Level of Consciousness: awake  Pain Score: 4  Pain Management: adequate  Airway Patency:patent  Dental exam unchanged from preop  Yes    Nausea/Vomiting: none  Cardiovascular Status: acceptable  Respiratory Status: acceptable  Postoperative Hydration acceptable      Ferdinand Joaquin MD  2/26/2024 1:36 PM

## 2024-02-26 NOTE — OPERATIVE REPORT
PRE-OP DIAGNOSIS:  Uterovaginal prolapse    POST-OP DIAGNOSIS:  Same    PROCEDURE:  Transvaginal hysterectomy, bilateral salingectomy, Repair of enterocele; uterosacral ligament suspension; anterior colporrhaphy; posterior colporrhaphy; cystourethroscopy    SURGEON:  Jada Santiago DO    ASSISTANT:  Maurilio    ANESTHESIA:  General    EBL:  100 ml  UOP: 300cc    Specimen: uterus, cervix, bilateral fallopian tubes    No complications    Findings: bilateral ureteral efflux, no evidence of bladder, ureteral, or urethral injury. Hemostasis upon completion.     PROCEDURE:  The patient was taken to the operating room, with IV running after informed consent was obtained.   She was placed in the supine position and induced under general anesthesia.    The patient was then placed in the dorsal lithotomy position and prepped and draped in the usual sterile fashion.    The Bookwalter retractor was utilized and retraction was placed into the vagina.  The cervix was grasped with tenacula and cautery was used to make a circumferential incision after infiltration local solution (.25% marcaine with epinepherine).    The anterior and posterior cul-de-sacs were entered sharply and then the uterosacral cardinal ligament complexes were clamped, cut and suture ligated bilaterally.  The fundus of the uterus was delivered posteriorly and then the uteroovarian complexes were clamped and cut, freeing the specimen.  These pedicles were then also suture ligated.      A lap sponge was placed into the peritoneal cavity and inspection revealed normal appearing tubes and ovaries bilaterally.  Each fallopian tube was grasped with Kiran clamp, transected, and ligated. Hemostasis was noted.  Specimen was sent to pathology, uterus, cervix, bilateral fallopian tubes.    Due to the presence of significant apical prolapse and associated enterocele, it was necessary to proceed with suspension of the vaginal apex and enterocele repair.  The  uterosacral ligaments were identified and a Champagne stitch was placed and tagged to repair the enterocele. This suture was placed through the posterior vaginal wall, through the left uterosacral ligament, across the anterior rectum, through the right uterosacral ligament and then back through the posterior vaginal wall. Two uterosacral suspension stitches were then placed on each side of the pelvis. These were placed high on the uterosacral ligaments at and just proximal to the ischial spine. The uterosacral suspension stitches were then placed on each side of the pelvis for apical suspension.  Once these sutures were placed, the urethra was dilated to accommodate a 21 Trinidadian caliber cystoscope sheath and cystoscopy was undertaken.  Cystoscopy confirmed that there had been no injury to the bladder.  In addition, urine was seen from both ureteral orifices.    The cut edge of the anterior vaginal epithelium was then grasped with Allis clamps and the midline incision was made after infiltration with local solution (.25%marcaine with epinepherine).    Sharp dissection with Metzenbaum scissors was performed to dissect the redundant anterior vaginal epithelium from the underlying endopelvic connective tissue of the bladder.  0 Vicryl suture was then used to plicate the endopelvic connective tissue, obliterating the cystocele.  The excess vaginal epithelium was excised and the midline incision was then closed with 2-0 Vicryl suture in running locking fashion.  Hemostasis was noted.    The lap sponge was removed from the peritoneal cavity, hemostasis confirmed, and the vaginal apex was then closed with 2-0 Vicryl suture in a running locking fashion.  The supporting stitches were then tied down resulting in satisfactory elevation of the vaginal apex.     The bladder was emptied, cystoscopy was performed and urine was again seen from both ureteral orifices. There was no evidence of bladder, ureteral, or urethral  injury.    Attention was then directed to the posterior compartment.  The redundant posterior vaginal epithelium and perineal skin was excised after infiltration with .25% marcaine with epinepherine. 0 Vicryl suture was then used to plicate the rectovaginal connective tissue, obliterating the rectocele.  Additional sutures of 0 Vicryl were utilized to reconstruct the perineal body.  Rectal examination confirmed that none of these sutures had impinged the rectum.  2-0 Vicryl sutures were then used to reapproximate the vaginal epithelium in the midline in a running locking fashion.      Inspection at this point revealed a well-supported vaginal apex, anterior, and posterior compartments, with good hemostasis.  A Carvalho catheter was placed transurethrally.  The patient was then removed from the dorsal lithotomy position, awakened and extubated and transferred from the operating room to the recovery room in stable  condition, having tolerated the procedure well. Sponge, lap, and needle counts were correct.

## 2024-02-26 NOTE — ANESTHESIA PREPROCEDURE EVALUATION
Anesthesia PreOp Note    HPI:     Mila Vasquez is a 58 year old female who presents for preoperative consultation requested by: Jada Santiago DO    Date of Surgery: 2/26/2024    Procedure(s):  Total vaginal hysterectomy, possible bilateral salpingo oophorectomy, uterosacral vaginal vault suspension, anterior/ posterior /enterocele repair, possible placement of mid urethral sling, cystoscopy  Indication: Uterovaginal prolapse, stress incontinence    Relevant Problems   No relevant active problems       NPO:  Last Liquid Consumption Date: 02/25/24  Last Liquid Consumption Time: 2130  Last Solid Consumption Date: 02/25/24  Last Solid Consumption Time: 2130  Last Liquid Consumption Date: 02/25/24          History Review:  Patient Active Problem List    Diagnosis Date Noted    Essential hypertension     HTN (hypertension), benign 08/18/2020    Elevated blood pressure reading 07/09/2020    Increased appetite 07/09/2020    Obesity (BMI 30-39.9) 07/09/2020    Recurrent UTI 03/15/2016    Plantar fascial fibromatosis 06/20/2013    Contracture of tendon (sheath) 06/20/2013       Past Medical History:   Diagnosis Date    Disorder of thyroid     Essential hypertension     High blood pressure     Microscopic hematuria     Obesity (BMI 30-39.9)     PONV (postoperative nausea and vomiting)     Thyroid disease     hypothroid/hashimotos- resolved.  has seen Dr Vann in the past.     Varicose veins of both lower extremities     Visual impairment     contacts       Past Surgical History:   Procedure Laterality Date    EXC SKIN BENIG 1.1-2CM TRUNK,ARM,LEG  9/17/2013    Procedure: EXCISION OF AXILLA MASS;  Surgeon: Waqas Barker MD;  Location: [facility]    LAYR CLOS WND TRUNK,ARM,LEG 2.6-7.5  9/17/2013    Procedure: EXCISION OF AXILLA MASS;  Surgeon: Waqas Barker MD;  Location: [facility]    OTHER SURGICAL HISTORY      laproscopic    OTHER SURGICAL HISTORY      D & C, 2009, 2010        Medications Prior to Admission   Medication Sig Dispense Refill Last Dose    Turmeric (QC TUMERIC COMPLEX OR) Take by mouth.   2024    Calcium-Vitamin D-Vitamin K 600-1000-90 MG-UNT-MCG Oral Tab Take by mouth.   2024    LISINOPRIL 40 MG Oral Tab TAKE 1 TABLET(40 MG) BY MOUTH DAILY 90 tablet 0 2024 at 0900     Current Facility-Administered Medications Ordered in Epic   Medication Dose Route Frequency Provider Last Rate Last Admin    lactated ringers infusion   Intravenous Continuous Jada Santiago DO        acetaminophen (Tylenol Extra Strength) tab 1,000 mg  1,000 mg Oral Once Jada Santiago DO        famotidine (Pepcid) tab 20 mg  20 mg Oral Once Jada Santiago DO        Or    famotidine (Pepcid) 20 mg/2mL injection 20 mg  20 mg Intravenous Once Jada Santiago DO        metoclopramide (Reglan) tab 10 mg  10 mg Oral Once Jada Santiago DO        Or    metoclopramide (Reglan) 5 mg/mL injection 10 mg  10 mg Intravenous Once Jada Santiago DO        ceFAZolin (Ancef) 2 g in 20mL IV syringe premix  2 g Intravenous Once Jada Santiago DO         No current [facility]-ordered outpatient medications on file.       Allergies   Allergen Reactions    Gluten Flour OTHER (SEE COMMENTS)     Diarrhea        Family History   Problem Relation Age of Onset    High Blood Pressure Mother     High Blood Pressure Father     Stroke Father     Other (celiac) Father     High Blood Pressure Brother     High Blood Pressure Brother     Other (celiac) Sister      Social History     Socioeconomic History    Marital status:    Occupational History    Occupation:    Tobacco Use    Smoking status: Former     Types: Cigarettes     Quit date:      Years since quittin.1    Smokeless tobacco: Never   Vaping Use    Vaping Use: Never used   Substance and Sexual Activity    Alcohol use: Yes     Comment: Socially 3 X per week    Drug use: No       Available pre-op labs reviewed.              Vital Signs:  Body mass index is 33.47 kg/m².   height is 1.626 m (5' 4\") and weight is 88.5 kg (195 lb).   Vitals:    02/16/24 0850 02/19/24 1313   Weight: 90.7 kg (200 lb) 88.5 kg (195 lb)   Height: 1.626 m (5' 4\") 1.626 m (5' 4\")        Anesthesia Evaluation     Patient summary reviewed and Nursing notes reviewed    History of anesthetic complications (PONV)   Airway   Mallampati: II  TM distance: <3 FB  Neck ROM: full  Dental - Dentition appears grossly intact     Pulmonary     breath sounds clear to auscultation    ROS comment: Former smoker  Cardiovascular   (+) hypertension    Rhythm: regular  Rate: normal    Neuro/Psych - negative ROS     GI/Hepatic/Renal      Endo/Other      Comments: Obesity  Abdominal                  Anesthesia Plan:   ASA:  2  Plan:   General  Airway:  ETT and Video laryngoscope  Informed Consent Plan and Risks Discussed With:  Patient and spouse  Use of Blood Products Discussed With:  Patient  Blood Product Use Consented    Discussed plan with:  Attending      I have informed Mila LURDES Vasquez and/or legal guardian or family member of the nature of the anesthetic plan, benefits, risks including possible dental damage if relevant, major complications, and any alternative forms of anesthetic management.   All of the patient's questions were answered to the best of my ability. The patient desires the anesthetic management as planned.  Ferdinand Joaquin MD  2/26/2024 10:03 AM  Present on Admission:  **None**

## 2024-02-27 VITALS
RESPIRATION RATE: 18 BRPM | BODY MASS INDEX: 37.56 KG/M2 | TEMPERATURE: 99 F | HEIGHT: 64 IN | WEIGHT: 220 LBS | HEART RATE: 73 BPM | DIASTOLIC BLOOD PRESSURE: 86 MMHG | OXYGEN SATURATION: 96 % | SYSTOLIC BLOOD PRESSURE: 147 MMHG

## 2024-02-27 PROBLEM — N81.2 UTEROVAGINAL PROLAPSE, INCOMPLETE: Status: RESOLVED | Noted: 2024-02-26 | Resolved: 2024-02-27

## 2024-02-27 LAB
BASOPHILS # BLD AUTO: 0.02 X10(3) UL (ref 0–0.2)
BASOPHILS NFR BLD AUTO: 0.1 %
DEPRECATED RDW RBC AUTO: 41.5 FL (ref 35.1–46.3)
EOSINOPHIL # BLD AUTO: 0 X10(3) UL (ref 0–0.7)
EOSINOPHIL NFR BLD AUTO: 0 %
ERYTHROCYTE [DISTWIDTH] IN BLOOD BY AUTOMATED COUNT: 12.7 % (ref 11–15)
HCT VFR BLD AUTO: 36.8 %
HGB BLD-MCNC: 12.6 G/DL
IMM GRANULOCYTES # BLD AUTO: 0.07 X10(3) UL (ref 0–1)
IMM GRANULOCYTES NFR BLD: 0.4 %
LYMPHOCYTES # BLD AUTO: 2.55 X10(3) UL (ref 1–4)
LYMPHOCYTES NFR BLD AUTO: 15.5 %
MCH RBC QN AUTO: 30.5 PG (ref 26–34)
MCHC RBC AUTO-ENTMCNC: 34.2 G/DL (ref 31–37)
MCV RBC AUTO: 89.1 FL
MONOCYTES # BLD AUTO: 1.09 X10(3) UL (ref 0.1–1)
MONOCYTES NFR BLD AUTO: 6.6 %
NEUTROPHILS # BLD AUTO: 12.71 X10 (3) UL (ref 1.5–7.7)
NEUTROPHILS # BLD AUTO: 12.71 X10(3) UL (ref 1.5–7.7)
NEUTROPHILS NFR BLD AUTO: 77.4 %
PLATELET # BLD AUTO: 247 10(3)UL (ref 150–450)
RBC # BLD AUTO: 4.13 X10(6)UL
WBC # BLD AUTO: 16.4 X10(3) UL (ref 4–11)

## 2024-02-27 PROCEDURE — 85025 COMPLETE CBC W/AUTO DIFF WBC: CPT | Performed by: OBSTETRICS & GYNECOLOGY

## 2024-02-27 RX ORDER — IBUPROFEN 600 MG/1
600 TABLET ORAL EVERY 6 HOURS SCHEDULED
Status: DISCONTINUED | OUTPATIENT
Start: 2024-02-27 | End: 2024-02-27

## 2024-02-27 NOTE — PLAN OF CARE
Problem: Patient Centered Care  Goal: Patient preferences are identified and integrated in the patient's plan of care  Description: Interventions:  - What would you like us to know as we care for you? Todays my birthday  - Provide timely, complete, and accurate information to patient/family  - Incorporate patient and family knowledge, values, beliefs, and cultural backgrounds into the planning and delivery of care  - Encourage patient/family to participate in care and decision-making at the level they choose  - Honor patient and family perspectives and choices  Outcome: Progressing     Problem: Patient/Family Goals  Goal: Patient/Family Long Term Goal  Description: Patient's Long Term Goal: To go home    Interventions:  -   - See additional Care Plan goals for specific interventions  Outcome: Progressing  Goal: Patient/Family Short Term Goal  Description: Patient's Short Term Goal: To be pain free    Interventions:   - Follow plan of care  - See additional Care Plan goals for specific interventions  Outcome: Progressing

## 2024-02-27 NOTE — PROGRESS NOTES
59 year old y/o female s/p transvaginal hysterectomy, bilateral salpingectomy, repair of enterocele, uterosacral ligament suspension, anterior colporrhaphy, posterior colporrhaphy, cystourethroscopy POD #1    Pt doing well. Pain improving this morning after dose of norco.  Denies CP, SOB, nausea, vomiting, calf pain.  Tolerates PO diet. Has been up in chair.    /77 (BP Location: Right arm)   Pulse 90   Temp 98.5 °F (36.9 °C) (Oral)   Resp 18   Ht 64\"   Wt 220 lb (99.8 kg)   SpO2 95%   BMI 37.76 kg/m²   Gen: NAD  Abd: soft, NT/ND  : minimal blood on peripad, no active bleeding, Bentley catheter to gravity  Ext: SCDs b/l, no calf tenderness    Labs:  Recent Labs   Lab 02/27/24  0627   RBC 4.13   HGB 12.6   HCT 36.8   MCV 89.1   MCH 30.5   MCHC 34.2   RDW 12.7   NEPRELIM 12.71*   WBC 16.4*   .0         I/O:   Intake/Output Summary (Last 24 hours) at 2/27/2024 0838  Last data filed at 2/27/2024 0311  Gross per 24 hour   Intake 1720 ml   Output 1250 ml   Net 470 ml     800 mL urine in bentley bag    A/P: 59 year old y/o female s/p TVH, USLS, APE repairs, cysto POD #1  - stable for discharge, plan for this morning/lunchtime  - home with sheree, RN to do bentley teaching  - scheduled PO pain meds, IBP q 6 hrs & Norco q 4 hrs  - home meds as prescribed  - general diet as tolerated  - ambulate  - activity restrictions reviewed  - bowel mgmt reviewed  - follow up in 1 week for voiding trial (nurse visit), 2 weeks with Dr. Santiago, sooner as needed    Giulia Boles PA-C

## 2024-02-28 ENCOUNTER — TELEPHONE (OUTPATIENT)
Dept: UROLOGY | Facility: CLINIC | Age: 59
End: 2024-02-28

## 2024-02-28 NOTE — TELEPHONE ENCOUNTER
TC to pt following surgery  Left message for pt to call office with any questions/concerns    Giulia Boles PA-C

## 2024-03-04 ENCOUNTER — OFFICE VISIT (OUTPATIENT)
Dept: UROLOGY | Facility: HOSPITAL | Age: 59
End: 2024-03-04
Attending: OBSTETRICS & GYNECOLOGY
Payer: COMMERCIAL

## 2024-03-04 VITALS — WEIGHT: 220 LBS | BODY MASS INDEX: 37.56 KG/M2 | HEIGHT: 64 IN | TEMPERATURE: 98 F

## 2024-03-04 DIAGNOSIS — Z98.890 POST-OPERATIVE STATE: Primary | ICD-10-CM

## 2024-03-04 PROCEDURE — 99212 OFFICE O/P EST SF 10 MIN: CPT

## 2024-03-04 NOTE — PATIENT INSTRUCTIONS
Voiding Trial Instructions  You have passed your voiding trial at 0845.  Please make sure you are drinking some water today.  You can take your Motrin to help with any swelling from the catheter.  It is important to try and empty your bladder every two hours during the day.  Try and empty again at 10:45.  If you are unable to empty, try and drink a glass of water and try again 30-60 minutes later.  If you have been unable to empty your bladder by 12:45, which is 4 hours after leaving the office, you will most likely be uncomfortable and you will need to come back into the office.  If it is after 4pm, go to an urgent care or emergency room to have a catheter placed again.      Please call our office at [phone] if you have any questions or concerns.

## 2024-03-04 NOTE — PROGRESS NOTES
Incoming telephone call from the patient with voiding update.   Patient passed voiding trial this morning.  Patient reports voiding 3 x since leaving the office this morning.  Patient reports voiding in \"normal\" amounts, with a steady stream, and feels like she is emptying well.    Reviewed post op restrictions and bowel management.  Reviewed s/sx of urination retention and UTI.  Reviewed activity restrictions.    All questions answered.   Encouraged the patient to call with questions or concerns. Office number provided, [phone].  Keep scheduled follow up appointment on 3/20/24 with Dr. Santiago.  Patient verbalizes understanding and agrees to plan.

## 2024-03-04 NOTE — PROGRESS NOTES
S:   Patient here for voiding trial following surgery  Procedure Date: 2/26/24  Procedure Name: Transvaginal hysterectomy, bilateral salingectomy, Repair of enterocele; uterosacral ligament suspension; anterior colporrhaphy; posterior colporrhaphy; cystourethroscopy   Doing well, no complaints  BMs regular using Miralax, MOM, and Other fiber supplement    Pain well controlled using Ibuprofen, Norco, and Other finished norco lastnight    Denies s/sx of UTI   Tolerates ambulation & diet well     O:   Vitals:    03/04/24 0828   Temp: 98.1 °F (36.7 °C)     Gen: NAD  Pulm: nl effort  : No active bleeding.   Discharge scant  Surgical sites-WNL  Bentley intact and draining clear yellow urine. Disconnected bentley catheter from drainage tubing.   Using a 60 cc Roxanne syringe, 300ml sterile water was instilled per gravity.  Balloon deflated using 10 cc syringe, and catheter removed.    Pt up to bathroom and voided 210mL.  Patient passes voiding trial.    Patient  tolerated procedure well.      A:   Encounter Diagnosis   Name Primary?    Post-operative state Yes       P:  Patient passed voiding trial-instructed to call in early afternoon for voiding update. Office number provided.  Reviewed post op restrictions and bowel management  Reviewed signs and sx of urinary retention and UTI.  Discussed return to work/activity plans  Follow-up with Dr. Jada Santiago on 3/20/24 weeks.   All questions answered  She understands and agrees to plan

## 2024-03-20 ENCOUNTER — OFFICE VISIT (OUTPATIENT)
Dept: UROLOGY | Facility: HOSPITAL | Age: 59
End: 2024-03-20
Attending: OBSTETRICS & GYNECOLOGY
Payer: COMMERCIAL

## 2024-03-20 VITALS — WEIGHT: 220 LBS | BODY MASS INDEX: 37.56 KG/M2 | TEMPERATURE: 98 F | HEIGHT: 64 IN | RESPIRATION RATE: 18 BRPM

## 2024-03-20 DIAGNOSIS — Z98.890 POST-OPERATIVE STATE: Primary | ICD-10-CM

## 2024-03-20 DIAGNOSIS — R30.0 DYSURIA: ICD-10-CM

## 2024-03-20 PROCEDURE — 87086 URINE CULTURE/COLONY COUNT: CPT | Performed by: OBSTETRICS & GYNECOLOGY

## 2024-03-20 PROCEDURE — 99212 OFFICE O/P EST SF 10 MIN: CPT

## 2024-03-20 NOTE — PROGRESS NOTES
She is s/p Post-Op Summary  Procedure Date: 02/26/24  Procedure Name: Vaginal Hysterectomy;Uterosacral Ligament Suspension;Anterior/Posterior/Enterocele Repair;Cystoscopy;Bilateral Salpingectomy  Post-Op Symptoms: Patient denies pain, ERIC, UUI, prolapse symptoms, nausea/vomitting, fevers/chills, bleeding, voiding dysfunction, and defecatory dysfunction.  Do you feel your surgery was successful?: Very successful  Compared to before surgery are you?: Much better  If you could go back to before your surgery, would you do it all over again?: Definitely yes  How satisfied are you with the results of your surgery?: Completely satisfied    Doing well   she complains of vag pain/burning & dysuria  Voids freely  Intermittent urinary complaints  BMs reg  Minimal Pain, IBP prn  Tolerates ambulation & diet  No leakage  Scant discharge    Temp 98.1 °F (36.7 °C) (Oral)   Resp 18   Ht 64\"   Wt 220 lb (99.8 kg)   BMI 37.76 kg/m²     Gen: NAD  CV: RRR  Pulm:nl effort  Abd: soft  : tolerated vaginal exam. Sutures intact. No active bleeding. Good support  Voided in BR 250cc, sterile technique used to empty bladder 15mL (specimen sent)    Ucx, follow sx, tx if +  Reviewed post op restrictions and bowel management  Discussed return to work/activity plans  Call with s/sx of UTI  Plan for follow up in 3 months, sooner prn    All questions answered  She understands and agrees to plan    Jada Santiago, DO

## 2024-06-14 ENCOUNTER — OFFICE VISIT (OUTPATIENT)
Dept: UROLOGY | Facility: HOSPITAL | Age: 59
End: 2024-06-14
Attending: OBSTETRICS & GYNECOLOGY
Payer: COMMERCIAL

## 2024-06-14 VITALS
DIASTOLIC BLOOD PRESSURE: 64 MMHG | BODY MASS INDEX: 35.34 KG/M2 | SYSTOLIC BLOOD PRESSURE: 130 MMHG | TEMPERATURE: 99 F | HEIGHT: 64 IN | RESPIRATION RATE: 18 BRPM | WEIGHT: 207 LBS

## 2024-06-14 DIAGNOSIS — N39.41 URGENCY INCONTINENCE: ICD-10-CM

## 2024-06-14 DIAGNOSIS — Z98.890 POST-OPERATIVE STATE: ICD-10-CM

## 2024-06-14 DIAGNOSIS — N95.2 POSTMENOPAUSAL ATROPHIC VAGINITIS: Primary | ICD-10-CM

## 2024-06-14 DIAGNOSIS — N32.81 DETRUSOR INSTABILITY: ICD-10-CM

## 2024-06-14 DIAGNOSIS — N81.84 PELVIC MUSCLE WASTING: ICD-10-CM

## 2024-06-14 PROCEDURE — 99212 OFFICE O/P EST SF 10 MIN: CPT

## 2024-06-14 RX ORDER — ESTRADIOL 0.1 MG/G
CREAM VAGINAL
Qty: 42.5 G | Refills: 3 | Status: SHIPPED | OUTPATIENT
Start: 2024-06-14

## 2024-06-14 NOTE — PROGRESS NOTES
She is s/p Post-Op Summary  Procedure Date: 02/26/24  Procedure Name: Vaginal Hysterectomy;Uterosacral Ligament Suspension;Enterocele Repair;Anterior and Posterior Repair;Cystoscopy;Bilateral Salpingectomy  Post-Op Symptoms: Patient denies pain, ERIC, UUI, prolapse symptoms, nausea/vomitting, fevers/chills, bleeding, voiding dysfunction, and defecatory dysfunction.  Do you feel your surgery was successful?: Very successful  Compared to before surgery are you?: A little better  If you could go back to before your surgery, would you do it all over again?: Definitely yes  How satisfied are you with the results of your surgery?: Completely satisfied    Doing well   She reports some UUI, some dyspareunia   Voids freely  No UTIs  BMs reg    No Pain, but reports some dyspareunia at apex  Tolerates ambulation & diet  No bulge      /64   Temp 98.6 °F (37 °C)   Resp 18   Ht 64\"   Wt 207 lb (93.9 kg)   BMI 35.53 kg/m²     Gen: NAD  CV: RRR  Pulm:nl effort  Abd: soft  : tolerated vaginal exam. Suture site well healed. No active bleeding. Good support  Tender at apex, scant granulation tissue at right apex, no bleeding.  PROLAPSE ASSESSMENT SCALE:      Aa:-3 Ba:-3 C:-9   gh: pb: tvl:9   Ap:-3 Bp:-3 D:     Discussed dyspareunia, increase coital frequency. Consider PT if sx persist    Discussed mgmt of vulvovaginal atrophy with vaginal estrogen cream. Reviewed associated benefits, risks, alternatives, and goals. Recommend low dose twice weekly mgmt   Initiate vag estrogen twice weekly    Reviewed bowel management    Discussed return to work/activity plans, daily pelvic exercises    Discussed dietary and behavioral modifications for mgmt of urinary symptoms  Discussed weight management and benefits of weight loss on urinary symptoms  Reviewed AUA/SUFU guidelines on mgmt of non-neurogenic OAB  Discussed pharmacologic and nonpharmacologic mgmt options of urinary symptoms - reviewed risks, benefits, alternatives, and goals  of treatment  Discussed specific risks related to OAB meds including, but not limited to dry mouth, constipation, blurry vision, cognitive changes, and BP elevation.  Bladder diet/drill, pelvic exercises (info provided)    Call with s/sx of UTI  Plan for follow up in 6 wks, one year post op exam, sooner prn    All questions answered  She understands and agrees to plan    Jada Santiago, DO

## 2024-08-07 ENCOUNTER — VIRTUAL PHONE E/M (OUTPATIENT)
Dept: UROLOGY | Facility: HOSPITAL | Age: 59
End: 2024-08-07
Attending: OBSTETRICS & GYNECOLOGY
Payer: COMMERCIAL

## 2024-08-07 DIAGNOSIS — N94.10 DYSPAREUNIA, FEMALE: ICD-10-CM

## 2024-08-07 DIAGNOSIS — N81.84 PELVIC MUSCLE WASTING: Primary | ICD-10-CM

## 2024-08-07 DIAGNOSIS — N32.81 DETRUSOR INSTABILITY: ICD-10-CM

## 2024-08-07 DIAGNOSIS — R35.1 NOCTURIA: ICD-10-CM

## 2024-08-07 DIAGNOSIS — N39.41 URGE INCONTINENCE: ICD-10-CM

## 2024-08-07 NOTE — PROGRESS NOTES
Patient to follow up dyspareunia  Given circumstances surrounding COVID-19 this visit is being conducted as a televisit with pt's consent.  Pt in safe, private environment for televisit, provider located in the office setting      She is currently using vag estrogen  Pelvic exercises    Much less leakage  Less pad use    Some constipation    She reports +improvement     Less dyspareunia  Using vag estrogen twice weekly    27# wt loss, feeling better overall    Impression/Plan:    ICD-10-CM    1. Pelvic muscle wasting  N81.84       2. Detrusor instability  N32.81       3. Nocturia  R35.1       4. Urge incontinence  N39.41       5. Dyspareunia, female  N94.10           Bowel management reviewed. Discussed using fiber daily w/ addition of miralax as needed    Dyspareunia - resolving    Discussed dietary and behavioral modifications for mgmt of urinary symptoms  Discussed weight management and benefits of weight loss on urinary symptoms  Reviewed AUA/SUFU guidelines on mgmt of non-neurogenic OAB  Discussed pharmacologic and nonpharmacologic mgmt options of urinary symptoms - reviewed risks, benefits, alternatives, and goals of treatment  Discussed specific risks related to OAB meds including, but not limited to dry mouth, constipation, blurry vision, cognitive changes, and BP elevation.    Discussed mgmt of vulvovaginal atrophy with vaginal estrogen cream. Reviewed associated benefits, risks, alternatives, and goals. Recommend low dose twice weekly mgmt   Cont vag estrogen    All questions answered  She understands and agrees to plan    Return in about 6 months (around 2/7/2025).    Jada Santiago, DO, FACOG, FACS    The 21st Century Cures Act makes medical notes like these available to patients in the interest of transparency. However, be advised this is a medical document. It is intended as peer to peer communication. It is written in medical language and may contain abbreviations or verbiage that are unfamiliar. It  may appear blunt or direct. Medical documents are intended to carry relevant information, facts as evident, and the clinical opinion of the practitioner.

## 2024-08-07 NOTE — PATIENT INSTRUCTIONS
[email or web address]://[email or web address]    [facility]    BOWEL REGIMEN    Constipation can have detrimental effects on bladder function and can worsen the symptoms of prolapse.  It is important to avoid constipation.    The first step for treating constipation is to increase the amount of fiber in your diet.  It is usually difficult to get enough fiber each day in the food we eat.  Therefore, the best way to increase fiber is to take a daily fiber supplement.      XX Fiber Supplement  (Metamucil, Citrucel, Benefiber, etc) -- daily!!    Begin with 1 dose (as instructed on the package) every morning.    If the stool is too hard, or if the stool consists of small, hard, marbles, you may need to increase to 1 dose each morning and 1 dose each evening.    If you find it difficult to take the fiber as directed (i.e. mixed with water or juice), you can mix the fiber with your cereal or with applesauce.    Don’t worry if you have to increase the amount---fiber is not addictive and will not cause diarrhea.      _____Milk of Magnesia    Begin with 1 teaspoon every evening.  This is a very low dose---approximately one-sixth of the typical dose.  You may need to increase the amount depending on your results.      X Miralax -- as needed to help with movement    Miralax is a laxative available over the counter.  It can be used on a long-term basis if necessary.  The usual starting dose is 1 capful of powder mixed in fluid each morning.  The dose can be adjusted up or down depending on results and consistency of stool.

## 2025-03-19 ENCOUNTER — OFFICE VISIT (OUTPATIENT)
Dept: UROLOGY | Facility: HOSPITAL | Age: 60
End: 2025-03-19
Attending: OBSTETRICS & GYNECOLOGY
Payer: COMMERCIAL

## 2025-03-19 VITALS — HEIGHT: 64 IN | RESPIRATION RATE: 18 BRPM | BODY MASS INDEX: 28.51 KG/M2 | WEIGHT: 167 LBS

## 2025-03-19 DIAGNOSIS — N32.81 DETRUSOR INSTABILITY: ICD-10-CM

## 2025-03-19 DIAGNOSIS — N95.2 POSTMENOPAUSAL ATROPHIC VAGINITIS: ICD-10-CM

## 2025-03-19 DIAGNOSIS — N81.84 PELVIC MUSCLE WASTING: Primary | ICD-10-CM

## 2025-03-19 DIAGNOSIS — Z98.890 POST-OPERATIVE STATE: ICD-10-CM

## 2025-03-19 PROCEDURE — 99212 OFFICE O/P EST SF 10 MIN: CPT

## 2025-03-19 NOTE — PROGRESS NOTES
She is s/p Post-Op Summary  Procedure Date: 02/26/24  Procedure Name: Vaginal Hysterectomy, Uterosacral Ligament Suspension, Anterior/Posterior/Enterocele Repair, Cystoscopy, Bilateral Salpingectomy  Post-Op Symptoms: Patient denies pain, ERIC, UUI, prolapse symptoms, nausea/vomitting, fevers/chills, bleeding, voiding dysfunction, and defecatory dysfunction.  Do you feel your surgery was successful?: Very successful  Compared to before surgery are you?: Much better  If you could go back to before your surgery, would you do it all over again?: Definitely yes  How satisfied are you with the results of your surgery?: Completely satisfied    Doing well   no complaints  Voids intermittent, shifts to void  Nocturia x1-2  No UTIs  BMs constipated   No Pain, rare dyspareunia (improved)    Tolerates ambulation & diet  No leakage  No bulge  Vag estrogen twice weekly  Pelvic exercises    Resp 18   Ht 64\"   Wt 167 lb (75.8 kg)   BMI 28.67 kg/m²     Gen: NAD  CV: RRR  Pulm:nl effort  Abd: soft  : tolerated vaginal exam. Suture site well healed. No active bleeding. Good support  Tender at apex, scant granulation tissue at right apex, no bleeding.  PROLAPSE ASSESSMENT SCALE:                                                 Aa:-3 Ba:-3 C:-9   gh: pb: tvl:9   Ap:-3 Bp:-3 D:     Discussed mgmt of vulvovaginal atrophy with vaginal estrogen cream. Reviewed associated benefits, risks, alternatives, and goals. Recommend low dose twice weekly mgmt     Bowel management reviewed. Discussed using fiber daily w/ addition of miralax as needed    Discussed daily pelvic exercises    Call with s/sx of UTI  Plan for follow up in one year, sooner prn    All questions answered  She understands and agrees to plan    Jada Santiago, DO

## (undated) DEVICE — TRAY PREP WET SKIN 4 COMPARTMENT 6 SPNG 2 TWL

## (undated) DEVICE — TUBING SUCT L12FT DIA6MM CLR N CNDTVE W/ MAXI

## (undated) DEVICE — PACK CDS MINOR GENERAL

## (undated) DEVICE — HANDLE SUCT REG CAP FLANGETIP SMOOTH YANK

## (undated) DEVICE — SOLUTION IRRIG 1000ML 0.9% NACL USP BTL

## (undated) DEVICE — Device

## (undated) DEVICE — DRAPE URO 112X63X131IN POLYPR FEN W/ PCH

## (undated) DEVICE — NEEDLE HYPO 22X1-1/2

## (undated) DEVICE — SPONGE LAP 4X18IN 7IN LOOP

## (undated) DEVICE — HOOK RETRCT 5MM SHRP E STAY DISP LONE STAR

## (undated) DEVICE — SUTURE COAT VCRL 2-0 27IN ABSRB VLT 36MM CT-1

## (undated) DEVICE — SUTURE COAT VCRL 1 27IN ABSRB VLT 36MM CT-1

## (undated) DEVICE — TRAY CATH 16FR F INCLUDE BARDX IC COMPLT CARE

## (undated) DEVICE — GLOVE GAMMEX PI HYBRID LF 6.0

## (undated) DEVICE — SOLUTION IV 1000ML DIL ST H2O

## (undated) DEVICE — DRAPE BUTTOCKS

## (undated) DEVICE — SUTURE VCRL 0 L27IN ABSRB VLT L36MM CT-1 1/2

## (undated) DEVICE — GLOVE ENCR MICRO S87 6.5

## (undated) DEVICE — SET IRRIG L96IN POST OP W/ NVENT 2ND PIERCING

## (undated) NOTE — LETTER
[facility] - [facility] UR[facility]YNECOLOGY  [address]  [facility] [address]  PH: [phone]  FAX: [phone]   Consent to Procedure/Sedation    Date: __1/8/2024_____    Time: ___6:38 AM ___    1. I authorize the performance upon Mila Vasquez the following:  Urodynamics (UDS)      2. I authorize Dr. Jada Santiago (and whomever is designated as the doctor’s assistant), to perform the above mentioned procedures.    3. If any unforeseen conditions arise during this procedure calling for additional procedures, operations, or medications (including anesthesia and blood transfusion), I  further request and authorize the doctor to do whatever he/she deems advisable in my interest.    4. I consent to the taking and reproduction of any photographs in the course of this procedure for professional purposes.    5. I consent to the administration of such sedation as may be considered necessary or advisable by the physician responsible for this service, with the exception of  _____________________________.    6. I have been informed by my doctor of the nature and purpose of this procedure/sedation, possible alternative methods of treatment, risk involved and possible complications.    7. If I have a Do Not Resuscitate (DNR) order in place, the physician and I (or the  individual authorized to consent on my behalf) will discuss and agree as to whether the  Do Not Resuscitate (DNR) order will remain in effect or will be discontinued during the  performance of the procedure and the applicable recovery period. If the Do Not  Resuscitate (DNR) order is discontinued and is to be reinstated following the  procedure/recovery period, the physician will determine when the applicable recovery  period ends for purposes of reinstating the Do Not Resuscitate (DNR) order.    8. In the event your procedure results in extended X-Ray/Fluoroscopy time, you may develop a skin reaction.    Signature of  Patient:  ___________________________    Signature of person authorized to consent for patient: Relationship to patient:  ___________________________    ___________________    Witness: ____________________     Date: ______________    Printed: 2024   6:38 AM    Patient Name: Mila Vasquez        : 1965       Medical Record #: [id number]